# Patient Record
Sex: FEMALE | Race: BLACK OR AFRICAN AMERICAN | Employment: UNEMPLOYED | URBAN - METROPOLITAN AREA
[De-identification: names, ages, dates, MRNs, and addresses within clinical notes are randomized per-mention and may not be internally consistent; named-entity substitution may affect disease eponyms.]

---

## 2021-04-01 ENCOUNTER — OFFICE VISIT (OUTPATIENT)
Dept: ORTHOPEDIC SURGERY | Age: 41
End: 2021-04-01
Payer: COMMERCIAL

## 2021-04-01 VITALS — WEIGHT: 215 LBS | TEMPERATURE: 98.2 F | HEIGHT: 70 IN | BODY MASS INDEX: 30.78 KG/M2

## 2021-04-01 DIAGNOSIS — M65.4 DE QUERVAIN'S DISEASE (RADIAL STYLOID TENOSYNOVITIS): Primary | ICD-10-CM

## 2021-04-01 PROCEDURE — 99203 OFFICE O/P NEW LOW 30 MIN: CPT | Performed by: ORTHOPAEDIC SURGERY

## 2021-04-01 PROCEDURE — G8428 CUR MEDS NOT DOCUMENT: HCPCS | Performed by: ORTHOPAEDIC SURGERY

## 2021-04-01 PROCEDURE — G8419 CALC BMI OUT NRM PARAM NOF/U: HCPCS | Performed by: ORTHOPAEDIC SURGERY

## 2021-04-01 PROCEDURE — 4004F PT TOBACCO SCREEN RCVD TLK: CPT | Performed by: ORTHOPAEDIC SURGERY

## 2021-04-01 PROCEDURE — 20550 NJX 1 TENDON SHEATH/LIGAMENT: CPT | Performed by: ORTHOPAEDIC SURGERY

## 2021-04-01 RX ORDER — IBUPROFEN 600 MG/1
600 TABLET ORAL EVERY 6 HOURS PRN
COMMUNITY
End: 2022-05-23

## 2021-04-01 RX ORDER — TRIAMCINOLONE ACETONIDE 40 MG/ML
20 INJECTION, SUSPENSION INTRA-ARTICULAR; INTRAMUSCULAR ONCE
Status: COMPLETED | OUTPATIENT
Start: 2021-04-01 | End: 2021-04-01

## 2021-04-01 RX ORDER — LIDOCAINE HYDROCHLORIDE 10 MG/ML
0.5 INJECTION, SOLUTION INFILTRATION; PERINEURAL ONCE
Status: COMPLETED | OUTPATIENT
Start: 2021-04-01 | End: 2021-04-01

## 2021-04-01 RX ORDER — AZITHROMYCIN 250 MG/1
250 TABLET, FILM COATED ORAL DAILY
COMMUNITY
End: 2022-05-23 | Stop reason: ALTCHOICE

## 2021-04-01 RX ORDER — BENZONATATE 200 MG/1
200 CAPSULE ORAL 3 TIMES DAILY PRN
COMMUNITY
End: 2022-05-23

## 2021-04-01 RX ADMIN — LIDOCAINE HYDROCHLORIDE 0.5 ML: 10 INJECTION, SOLUTION INFILTRATION; PERINEURAL at 08:41

## 2021-04-01 RX ADMIN — TRIAMCINOLONE ACETONIDE 20 MG: 40 INJECTION, SUSPENSION INTRA-ARTICULAR; INTRAMUSCULAR at 08:42

## 2021-04-01 SDOH — HEALTH STABILITY: MENTAL HEALTH: HOW OFTEN DO YOU HAVE A DRINK CONTAINING ALCOHOL?: NOT ASKED

## 2021-04-01 NOTE — PROGRESS NOTES
This 36 y.o., right hand dominant  unemployed woman is seen today with a chief complaint of pain in the left wrist.  Symptoms have been present for 1 month. Pain starts at the radial aspect of the wrist and may radiate proximally, towards the elbow or distally, towards the thumb. There is no pain at rest.  Pain is aggravated by wrist and thumb movement and gripping. Occasionally, a popping sensation is noted on thumb movement. The wrist swells at times. A small mass has been noticed recentlyTreatment has not been tried. The pain has worsened recently. There is no history of significant injury. The pain assessment has been reviewed and is correct as stated. The patient's social history, past medical history, family history, medications, allergies and review of systems, entered 4/1/21, have been reviewed, and dated and are recorded in the chart. On examination the patient is Height: 5' 10\" (177.8 cm) tall and weighs Weight: 215 lb (97.5 kg). Respirations are 18 per minute. The patient is well nourished, is oriented to time and place, demonstrates appropriate mood and affect as well as normal gait and station. There is soft tissue swelling over the radial styloid of the wrist.  There is a small cystic mass noted. There is tenderness over the 1st dorsal compartment. There is no tenderness over the base of the thumb metacarpal.  The grind test is negative for CMC arthritis. The Yan test is positive. Range of motion of the fingers, thumbs and wrists is full bilaterally, with flexion and ulnar deviation being painful on the left. Skin is intact, as is distal circulation and sensation. Gross muscle strength is normal bilaterally. Hand and wrist joints are stable. There are no subcutaneous nodules or enlarged epitrochlear lymph nodes.     Impression:  Radial styloid tendonitis of the wrist, left, with small retinacular ganglion cyst.    The nature of this medical problem is fully discussed with the

## 2022-05-23 ENCOUNTER — OFFICE VISIT (OUTPATIENT)
Dept: PRIMARY CARE CLINIC | Age: 42
End: 2022-05-23
Payer: COMMERCIAL

## 2022-05-23 VITALS
HEIGHT: 70 IN | HEART RATE: 91 BPM | DIASTOLIC BLOOD PRESSURE: 81 MMHG | OXYGEN SATURATION: 98 % | BODY MASS INDEX: 34.22 KG/M2 | WEIGHT: 239 LBS | SYSTOLIC BLOOD PRESSURE: 114 MMHG | TEMPERATURE: 97 F

## 2022-05-23 DIAGNOSIS — Z13.220 SCREENING FOR LIPID DISORDERS: ICD-10-CM

## 2022-05-23 DIAGNOSIS — Z11.59 NEED FOR HEPATITIS C SCREENING TEST: ICD-10-CM

## 2022-05-23 DIAGNOSIS — J32.9 RECURRENT SINUS INFECTIONS: ICD-10-CM

## 2022-05-23 DIAGNOSIS — Z13.0 SCREENING FOR DEFICIENCY ANEMIA: ICD-10-CM

## 2022-05-23 DIAGNOSIS — Z13.1 SCREENING FOR DIABETES MELLITUS: ICD-10-CM

## 2022-05-23 DIAGNOSIS — M54.32 LEFT SCIATIC NERVE PAIN: Primary | ICD-10-CM

## 2022-05-23 DIAGNOSIS — Z13.29 SCREENING FOR THYROID DISORDER: ICD-10-CM

## 2022-05-23 PROCEDURE — G8417 CALC BMI ABV UP PARAM F/U: HCPCS | Performed by: NURSE PRACTITIONER

## 2022-05-23 PROCEDURE — 99202 OFFICE O/P NEW SF 15 MIN: CPT | Performed by: NURSE PRACTITIONER

## 2022-05-23 PROCEDURE — 4004F PT TOBACCO SCREEN RCVD TLK: CPT | Performed by: NURSE PRACTITIONER

## 2022-05-23 PROCEDURE — G8427 DOCREV CUR MEDS BY ELIG CLIN: HCPCS | Performed by: NURSE PRACTITIONER

## 2022-05-23 RX ORDER — TERBINAFINE HYDROCHLORIDE 250 MG/1
250 TABLET ORAL DAILY
COMMUNITY

## 2022-05-23 RX ORDER — IBUPROFEN 800 MG/1
800 TABLET ORAL
Qty: 270 TABLET | Refills: 1 | Status: SHIPPED | OUTPATIENT
Start: 2022-05-23 | End: 2022-10-24

## 2022-05-23 RX ORDER — PREDNISONE 20 MG/1
20 TABLET ORAL DAILY
Qty: 5 TABLET | Refills: 0 | Status: SHIPPED | OUTPATIENT
Start: 2022-05-23 | End: 2022-05-28

## 2022-05-23 SDOH — ECONOMIC STABILITY: FOOD INSECURITY: WITHIN THE PAST 12 MONTHS, THE FOOD YOU BOUGHT JUST DIDN'T LAST AND YOU DIDN'T HAVE MONEY TO GET MORE.: NEVER TRUE

## 2022-05-23 SDOH — ECONOMIC STABILITY: FOOD INSECURITY: WITHIN THE PAST 12 MONTHS, YOU WORRIED THAT YOUR FOOD WOULD RUN OUT BEFORE YOU GOT MONEY TO BUY MORE.: NEVER TRUE

## 2022-05-23 ASSESSMENT — PATIENT HEALTH QUESTIONNAIRE - PHQ9
SUM OF ALL RESPONSES TO PHQ QUESTIONS 1-9: 0
SUM OF ALL RESPONSES TO PHQ9 QUESTIONS 1 & 2: 0
1. LITTLE INTEREST OR PLEASURE IN DOING THINGS: 0
2. FEELING DOWN, DEPRESSED OR HOPELESS: 0
SUM OF ALL RESPONSES TO PHQ QUESTIONS 1-9: 0

## 2022-05-23 ASSESSMENT — SOCIAL DETERMINANTS OF HEALTH (SDOH): HOW HARD IS IT FOR YOU TO PAY FOR THE VERY BASICS LIKE FOOD, HOUSING, MEDICAL CARE, AND HEATING?: NOT HARD AT ALL

## 2022-05-23 ASSESSMENT — ENCOUNTER SYMPTOMS
BOWEL INCONTINENCE: 0
DIARRHEA: 0
CHEST TIGHTNESS: 0
SHORTNESS OF BREATH: 0
BACK PAIN: 1
ABDOMINAL PAIN: 0
CONSTIPATION: 0

## 2022-05-23 NOTE — PATIENT INSTRUCTIONS
Take antihistamine daily: Zyrtec, Claritin, Allegra, Xyzal  Patient Education        Sciatica: Exercises  Introduction  Here are some examples of typical rehabilitation exercises for your condition. Start each exercise slowly. Ease off the exercise if you start to have pain. Your doctor or physical therapist will tell you when you can start theseexercises and which ones will work best for you. When you are not being active, find a comfortable position for rest. Some people are comfortable on the floor or a medium-firm bed with a small pillow under their head and another under their knees. Some people prefer to lie on their side with a pillow between their knees. Don't stay in one position fortoo long. Take short walks (10 to 20 minutes) every 2 to 3 hours. Avoid slopes, hills, and stairs until you feel better. Walk only distances you can manage withoutpain, especially leg pain. How to do the exercises  Back stretches    1. Get down on your hands and knees on the floor. 2. Relax your head and allow it to droop. Round your back up toward the ceiling until you feel a nice stretch in your upper, middle, and lower back. Hold this stretch for as long as it feels comfortable, or about 15 to 30 seconds. 3. Return to the starting position with a flat back while you are on your hands and knees. 4. Let your back sway by pressing your stomach toward the floor. Lift your buttocks toward the ceiling. 5. Hold this position for 15 to 30 seconds. 6. Repeat 2 to 4 times. Follow-up care is a key part of your treatment and safety. Be sure to make and go to all appointments, and call your doctor if you are having problems. It's also a good idea to know your test results and keep alist of the medicines you take. Where can you learn more? Go to https://chmina.ShowUhow. org and sign in to your [employer] account. Enter N573 in the Q2ebanking box to learn more about \"Sciatica: Exercises. \"     If you do not have an account, please click on the \"Sign Up Now\" link. Current as of: July 1, 2021               Content Version: 13.2  © 2006-2022 Healthwise, [employer]. Care instructions adapted under license by [facility] ([facility]). If you have questions about a medical condition or this instruction, always ask your healthcare professional. [address] any warranty or liability for your use of this information. Patient Education        Piriformis Syndrome: Exercises  Introduction  Here are some examples of exercises for you to try. The exercises may be suggested for a condition or for rehabilitation. Start each exercise slowly. Ease off the exercises if you start to have pain. You will be told when to start these exercises and which ones will work bestfor you. How to do the exercises  Hip rotator stretch    1. Lie on your back with both knees bent and your feet flat on the floor. 2. Put the ankle of your affected leg on your opposite thigh near your knee. 3. Use your hand to gently push your knee (on your affected leg) away from your body until you feel a gentle stretch around your hip. 4. Hold the stretch for 15 to 30 seconds. 5. Repeat 2 to 4 times. 6. Switch legs and repeat steps 1 through 5. Piriformis stretch    1. Lie on your back with your legs straight. 2. Lift your affected leg and bend your knee. With your opposite hand, reach across your body, and then gently pull your knee toward your opposite shoulder. 3. Hold the stretch for 15 to 30 seconds. 4. Repeat with your other leg. 5. Repeat 2 to 4 times on each side. Lower abdominal strengthening    1. Lie on your back with your knees bent and your feet flat on the floor. 2. Tighten your belly muscles by pulling your belly button in toward your spine. 3. Lift one foot off the floor and bring your knee toward your chest, so that your knee is straight above your hip and your leg is bent like the letter \"L. \"  4.  Lift the other knee up to the same position. 5. Lower one leg at a time to the starting position. 6. Keep alternating legs until you have lifted each leg 8 to 12 times. 7. Be sure to keep your belly muscles tight and your back still as you are moving your legs. Be sure to breathe normally. Follow-up care is a key part of your treatment and safety. Be sure to make and go to all appointments, and call your doctor if you are having problems. It's also a good idea to know your test results and keep alist of the medicines you take. Current as of: July 1, 2021               Content Version: 13.2  © 2006-2022 Healthwise, Incorporated. Care instructions adapted under license by [facility] ([facility]). If you have questions about a medical condition or this instruction, always ask your healthcare professional. [address] any warranty or liability for your use of this information.

## 2022-05-23 NOTE — PROGRESS NOTES
Korey Will (:  1980) is a 39 y.o. female,New patient, here for evaluation of the following chief complaint(s):  New Patient and Referral - General (ENT)         ASSESSMENT/PLAN:  1. Left sciatic nerve pain  -     [facility] - Physical Therapy  -take Prednisone as prescribed  -Take Ibuprofen with food as prescribed  -Perform exercises twice daily  2. Need for hepatitis C screening test  -     Hepatitis C Antibody; Future  3. Screening for thyroid disorder  -     TSH with Reflex; Future  -     T4, Free; Future  4. Screening for lipid disorders  -     Lipid Panel; Future  5. Screening for deficiency anemia  -     CBC with Auto Differential; Future  6. Screening for diabetes mellitus  -     Comprehensive Metabolic Panel; Future  7. Recurrent sinus infections  -     Katie Sage MD, Otolaryngology, [facility]  -Take antihistamine daily: Zyrtec, Claritin, Allegra, Xyzal    No follow-ups on file. Subjective   SUBJECTIVE/OBJECTIVE:  Back Pain  This is a chronic problem. The current episode started more than 1 year ago. The pain is present in the gluteal. The quality of the pain is described as aching. The pain radiates to the left thigh. The pain is mild. The symptoms are aggravated by lying down and bending. Pertinent negatives include no abdominal pain, bladder incontinence, bowel incontinence, chest pain, dysuria, fever, headaches, numbness, pelvic pain or weakness. She has tried nothing for the symptoms. Has had intermittent pain for 5 years, not taking any medication. Most recent sinus infection in April, evaluated at Urgent Care, treated with antibiotics, effective. Requesting ENT referral. More frequent since relocating from [place]. Review of Systems   Constitutional: Negative for activity change and fever. HENT: Negative for congestion. Eyes: Negative for visual disturbance.    Respiratory: Negative for chest tightness and shortness of breath. Cardiovascular: Negative for chest pain, palpitations and leg swelling. Gastrointestinal: Negative for abdominal pain, bowel incontinence, constipation and diarrhea. Endocrine: Negative for polyuria. Genitourinary: Negative for bladder incontinence, dysuria and pelvic pain. Musculoskeletal: Positive for back pain. Negative for arthralgias and myalgias. Skin: Negative for rash. Neurological: Negative for dizziness, weakness, light-headedness, numbness and headaches. Psychiatric/Behavioral: Negative for agitation, decreased concentration and sleep disturbance. The patient is not nervous/anxious. Objective     height is 5' 10\" (1.778 m) and weight is 239 lb (108.4 kg). Her temporal temperature is 97 °F (36.1 °C). Her blood pressure is 114/81 and her pulse is 91. Her oxygen saturation is 98%. BP Readings from Last 3 Encounters:   05/23/22 114/81     Wt Readings from Last 3 Encounters:   05/23/22 239 lb (108.4 kg)   04/01/21 215 lb (97.5 kg)     Physical Exam  Vitals reviewed. Constitutional:       Appearance: She is well-developed. HENT:      Head: Normocephalic. Right Ear: Hearing and external ear normal.      Left Ear: Hearing and external ear normal.      Nose: Nose normal.   Eyes:      General: Lids are normal.   Cardiovascular:      Rate and Rhythm: Normal rate and regular rhythm. Heart sounds: Normal heart sounds, S1 normal and S2 normal.   Pulmonary:      Effort: Pulmonary effort is normal.      Breath sounds: Normal breath sounds. Musculoskeletal:         General: Normal range of motion. Skin:     General: Skin is warm and dry. Findings: No rash. Neurological:      Mental Status: She is alert and oriented to person, place, and time. GCS: GCS eye subscore is 4. GCS verbal subscore is 5. GCS motor subscore is 6. Psychiatric:         Speech: Speech normal.         Behavior: Behavior normal. Behavior is cooperative.             On this date

## 2022-06-03 ENCOUNTER — APPOINTMENT (OUTPATIENT)
Dept: PHYSICAL THERAPY | Age: 42
End: 2022-06-03
Payer: COMMERCIAL

## 2022-06-10 ENCOUNTER — HOSPITAL ENCOUNTER (OUTPATIENT)
Dept: PHYSICAL THERAPY | Age: 42
Setting detail: THERAPIES SERIES
Discharge: HOME | End: 2022-06-10
Payer: COMMERCIAL

## 2022-06-10 PROCEDURE — 97161 PT EVAL LOW COMPLEX 20 MIN: CPT

## 2022-06-10 PROCEDURE — 97140 MANUAL THERAPY 1/> REGIONS: CPT

## 2022-06-10 PROCEDURE — 97530 THERAPEUTIC ACTIVITIES: CPT

## 2022-06-10 NOTE — PLAN OF CARE
[facility] [address] [address]  Phone: [phone]   Fax:     [phone]                                                       Physical Therapy Certification    Dear Referring Provider (secondary): VICKI Liu CNP,    We had the pleasure of evaluating the following patient for physical therapy services at [facility]. A summary of our findings can be found in the initial assessment below. This includes our plan of care. If you have any questions or concerns regarding these findings, please do not hesitate to contact me at the office phone number checked above. Thank you for the referral.       Physician Signature:_______________________________Date:__________________  By signing above (or electronic signature), therapists plan is approved by physician                  Patient: Tariq Fernandez   : 1980   MRN: [id number]  Referring Physician: Referring Provider (secondary): VICKI Liu CNP      Evaluation Date: 6/10/2022      Medical Diagnosis Information:  Diagnosis: M54.32 (ICD-10-CM) - Left sciatic nerve pain                                             Insurance information:       Precautions/ Contra-indications:   Latex Allergy:  [x]NO      []YES  Preferred Language for Healthcare:   [x]English       []other:    C-SSRS Triggered by Intake questionnaire (Past 2 wk assessment ):   [x] No, Questionnaire did not trigger screening.   [] Yes, Patient intake triggered C-SSRS Screening      [] C-SSRS Screening completed  [] PCP notified via Epic     SUBJECTIVE: Patient stated complaint: Pt here for evaluation for L sided LBP that radiates to her posterior hip and leg down to her knee, not below the knee. Hx of L ACL reconstruction many years ago (but she still feels like LLE has strength deficits).  This back/leg pain has been present for about 4-5 yrs. This has been an intermittent problem over the years, but worse lately. No formal treatment in the past. Denies numbness/ tingling. Worse with standing and walking, better with sitting, heat pad, and NSAIDs. She did have a steroid dose pack, which helped for several days, but the pain did return. Denies bowel/ bladder changes. *pain with walking even a few steps. Limit is about 50% of a block where she would need to sit down. *past 3 wks avg sleep: 3-4hours of sleep. Recently lost her brother, did not sleep for a week and a half. Relevant Medical History:   Functional Outcome Measure: FOTO = 41    Pain Scale: 6-7/10  Easing factors: sitting, rest, meds  Provocative factors: standing, walking. 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 Living Status/Prior Level of Function: Independent with ADLs and IADLs    OBJECTIVE:   Posture: stands with some excessive lordosis    Functional Mobility/Transfers: some difficulty turning on table. Palpation: tender L L4 and R L3, tender bilat superior glutes    Gait: (include devices/WB status)     Bandages/Dressings/Incisions: NA    Repeated Movements:worse with extension    ROM  Comments   Lumbar Flex Full hands to floor No pain   Lumbar Ext Mod limit and LBP into hips bilat      ROM LEFT RIGHT Comments   Lumbar Side Bend Mod limit w/p Mod limit w/p    Lumbar Rotation WFL Mod limit w/p    Quadrant      Hip Flexion   B Hips are WNL globally to hypermobile.     Hip Abd      Hip ER      Hip IR      Hip Extension      Knee Ext      Knee Flex      Hamstring Flex      Piriformis      Bobby test                Myotomes/Strength Normal Abnormal Comments   []ALL NORMAL      Hip Abd      Hip Ext      Hip flexion (L1-L2 femoral) [] []    Knee extension (L2-L4 femoral) [x] []    Knee flexion (S1 sciatic) x Dorsiflexion (L4-L5 deep peroneal) [x] []    Great Toe Ext (L5 deep peroneal nerve) [] []    Ankle Eversion (S1-S2 super peroneal) [] []    Ankle PF(S1-S2 tibial) [] []    Multifidus [] []    Transverse Ab [] []      Dermatomes Normal Abnormal Comments   []ALL NORMAL            inguinal area (L1)  [] []    anterior mid-thigh (L2) [] []    distal ant thigh/med knee (L3) [] []    medial lower leg and foot (L4) [] []    lateral lower leg and foot (L5) [] []    posterior calf (S1) [] []    medial calcaneus (S2) [] []      Reflexes Normal Abnormal Comments   []ALL NORMAL            S1-2 Seated achilles [] []    S1-2 Prone knee bend [] []    L3-4 Patellar tendon [] []    C5-6 Biceps [] []    C6 Brachioradialis [] []    C7-8 Triceps [] []    Clonus [] []    Babinski [] []    Shepard's [] []      Joint mobility:    []Normal    [x]Hypo   []Hyper    Neurodynamics:     Orthopedic Special Tests:     Single leg squat: mild hip/knee functional deficit on L, also increased L LBP   Neural dynamic tension testing Normal Abnormal Comments   Slump Test  - Degree of knee flexion:  [] []    SLR  [] []    0-30 [] []    30-70 [] []    Femoral nerve (L2-4) [] []       Normal Abnormal N/A Comments   Fwd Bend-aberrant or innominate mvmt) [] [x] [] Abberant, some difficulty w/ return from flex   Trendelenburg [] [] []    Kemps/Quadrant [] [] []    Kai Ferris [] [] []    AURORA/Casey [x] [] []    Hip scour [x] [] []    Supine to sit [] [] []    Prone knee bend [] [] []           Hip thrust [] [] []    SI distraction/compression [] [] []    Sacral Spring/thrust [] [] []               [x] Patient history, allergies, meds reviewed. Medical chart reviewed. See intake form. Review Of Systems (ROS):  [x]Performed Review of systems (Integumentary, CardioPulmonary, Neurological) by intake and observation. Intake form has been scanned into medical record.  Patient has been instructed to contact their primary care physician regarding ROS issues if not already being addressed at this time. Co-morbidities/Complexities (which will affect course of rehabilitation):  has no past medical history on file. []None           Arthritic conditions   []Rheumatoid arthritis (M05.9)  []Osteoarthritis (M19.91)   Cardiovascular conditions   []Hypertension (I10)  []Hyperlipidemia (E78.5)  []Angina pectoris (I20)  []Atherosclerosis (I70)  []CVA Musculoskeletal conditions   []Disc pathology   []Congenital spine pathologies   []Prior surgical intervention  []Osteoporosis (M81.8)  []Osteopenia (M85.8)   Endocrine conditions   []Hypothyroid (E03.9)  []Hyperthyroid Gastrointestinal conditions   []Constipation (E74.68)   Metabolic conditions   []Morbid obesity (E66.01)  []Diabetes type 1(E10.65) or 2 (E11.65)   []Neuropathy (G60.9)     Pulmonary conditions   []Asthma (J45)  []Coughing   []COPD (J44.9)   Psychological Disorders  []Anxiety (F41.9)  []Depression (F32.9)   []Other:   [x]Other:     Elevated BMI      Barriers to/and or personal factors that will affect rehab potential:              []Age  []Sex    []Smoker              []Motivation/Lack of Motivation                        [x]Co-Morbidities              []Cognitive Function, education/learning barriers              []Environmental, home barriers              []profession/work barriers  [x]past PT/medical experience  []other:  Justification:     Falls Risk Assessment (30 days):   [x] Falls Risk assessed and no intervention required.   [] Falls Risk assessed and Patient requires intervention due to being higher risk   TUG score (>12s at risk):     [] Falls education provided, including:         ASSESSMENT:   Functional Impairments:     [x]Noted lumbar/proximal hip hypomobility   []Noted lumbosacral and/or generalized hypermobility   [x]Decreased Lumbosacral/hip/LE functional ROM   [x]Decreased core/proximal hip strength and neuromuscular control    [x]Decreased LE functional strength    []Abnormal reflexes/sensation/myotomal/dermatomal deficits  [x]Reduced balance/proprioceptive control    []other:      Functional Activity Limitations (from functional questionnaire and intake)   [x]Reduced ability to tolerate prolonged functional positions   [x]Reduced ability or difficulty with changes of positions or transfers between positions   [x]Reduced ability to maintain good posture and demonstrate good body mechanics with sitting, bending, and lifting   [x]Reduced ability to sleep   [] Reduced ability or tolerance with driving and/or computer work   [x]Reduced ability to perform lifting, reaching, carrying tasks   []Reduced ability to squat   []Reduced ability to forward bend   [x]Reduced ability to ambulate prolonged functional periods/distances/surfaces   [x]Reduced ability to ascend/descend stairs   []other:       Participation Restrictions   []Reduced participation in self care activities   [x]Reduced participation in home management activities   []Reduced participation in work activities   [x]Reduced participation in social activities. []Reduced participation in sport/recreational activities. Classification:   [x]Signs/symptoms consistent with Lumbar instability/stabilization subgroup. [x]Signs/symptoms consistent with Lumbar mobilization/manipulation subgroup, myotomes and dermatomes intact. Meets manipulation criteria. []Signs/symptoms consistent with Lumbar direction specific/centralization subgroup   []Signs/symptoms consistent with Lumbar traction subgroup       [x]Signs/symptoms consistent with lumbar facet dysfunction   []Signs/symptoms consistent with lumbar stenosis type dysfunction   []Signs/symptoms consistent with nerve root involvement including myotome & dermatome dysfunction   []Signs/symptoms consistent with post-surgical status including: decreased ROM, strength and function.    [x]signs/symptoms consistent with pathology which may benefit from Dry needling     []other: Prognosis/Rehab Potential:      []Excellent   [x]Good    []Fair   []Poor    Tolerance of evaluation/treatment:    []Excellent   [x]Good    []Fair   []Poor     Physical Therapy Evaluation Complexity Justification  [x] A history of present problem with:  [] no personal factors and/or comorbidities that impact the plan of care;  [x]1-2 personal factors and/or comorbidities that impact the plan of care  []3 personal factors and/or comorbidities that impact the plan of care  [x] An examination of body systems using standardized tests and measures addressing any of the following: body structures and functions (impairments), activity limitations, and/or participation restrictions;:  [x] a total of 1-2 or more elements   [] a total of 3 or more elements   [] a total of 4 or more elements   [x] A clinical presentation with:  [x] stable and/or uncomplicated characteristics   [] evolving clinical presentation with changing characteristics  [] unstable and unpredictable characteristics;   [x] Clinical decision making of [] low, [] moderate, [] high complexity using standardized patient assessment instrument and/or measurable assessment of functional outcome. [x] EVAL (LOW) 68709 (typically 20 minutes face-to-face)  [] EVAL (MOD) 86062 (typically 30 minutes face-to-face)  [] EVAL (HIGH) 38496 (typically 45 minutes face-to-face)  [] RE-EVAL     PLAN:    Frequency/Duration:  Initially we will plan for up to 2x/wk for up to 5 visits per [facility] policy. If additional visits are requested, we will send progress note. Interventions:  [x]  Therapeutic exercise including: strength training, ROM, for LE, Glutes and core   [x]  NMR activation and proprioception for glutes , LE and Core   [x]  Manual therapy as indicated for Hip complex, LE and spine to include: Dry Needling/IASTM, STM, PROM, Gr I-IV mobilizations, manipulation.    [x]  Modalities as needed that may include: thermal agents, E-stim, Biofeedback, US, iontophoresis as indicated  [x]  Patient education on joint protection, postural re-education, activity modification, progression of HEP. HEP instruction: SL Rot, pelvic tilt - painfree ROM     GOALS:  Patient stated goal: able to walk community distances w/o LBP. [] Progressing: [] Met: [] Not Met: [] Adjusted  Therapist goals for Patient:   Short Term Goals: To be achieved in: 2 weeks  1. Independent in HEP and progression per patient tolerance, in order to prevent re-injury. [] Progressing: [] Met: [] Not Met: [] Adjusted  2. Patient will have a decrease in pain to facilitate improvement in movement, function, and ADLs as indicated by Functional Deficits. [] Progressing: [] Met: [] Not Met: [] Adjusted    Long Term Goals: To be achieved in: up to 8 weeks  1. Increase FOTO functional outcome score from 41 to 57  to assist with reaching prior level of function. [] Progressing: [] Met: [] Not Met: [] Adjusted  2. Patient will demonstrate increased AROM to WNL, good LS mobility, good hip ROM to allow for proper joint functioning as indicated by patients Functional Deficits. [] Progressing: [] Met: [] Not Met: [] Adjusted  3. Patient will demonstrate an increase in Strength to good proximal hip and core activation to allow for proper functional mobility as indicated by patients Functional Deficits. [] Progressing: [] Met: [] Not Met: [] Adjusted  4. Patient will return to all functional activities without increased symptoms or restriction. [] Progressing: [] Met: [] Not Met: [] Adjusted    Electronically signed by: Ashley Victoria PT , DPT, OCS #[id number]          Note: If patient does not return for scheduled/recommended follow up visits, this note will serve as a discharge from care along with the most recent update on progress.

## 2022-06-10 NOTE — FLOWSHEET NOTE
[address] [facility] [address]  Phone: [phone]   Fax:     [phone]    Physical Therapy Treatment Note/ Progress Report:     Date:  6/10/2022    Patient Name:  Brandon Norwood    :  1980  MRN: [id number]    Pertinent Medical History:      Medical/Treatment Diagnosis Information:  Diagnosis: M54.32 (ICD-10-CM) - Left sciatic nerve pain  Treatment Diagnosis: back pain, limited mobility, function    Insurance/Certification information:     Physician Information:  Referring Provider (secondary): VICKI Teixeira CNP  Plan of care signed (Y/N):     Date of Patient follow up with Physician:      Progress Report: []  Yes  [x]  No     Date Range for reporting period:  Beginnin/10/2022  Ending:    Progress report due (10 Rx/or 30 days whichever is less):      Recertification due (POC duration/ or 90 days whichever is less):     Visit # POC/ Insurance Allowable Auth Needed    (MSK) auth after 30 per ins []Yes    []No     Functional Scale:       Date Assessed: at eval  Test: FOTO  Score:     Pain level:  /10     History of Injury:Pt here for evaluation for L sided LBP that radiates to her posterior hip and leg down to her knee, not below the knee. Hx of L ACL reconstruction many years ago (but she still feels like LLE has strength deficits). This back/leg pain has been present for about 4-5 yrs. This has been an intermittent problem over the years, but worse lately. No formal treatment in the past. Denies numbness/ tingling. Worse with standing and walking, better with sitting, heat pad, and NSAIDs. She did have a steroid dose pack, which helped for several days, but the pain did return. Denies bowel/ bladder changes.      *pain with walking even a few steps. Limit is about 50% of a block where she would need to sit down. *past 3 wks avg sleep: 3-4hours of sleep. Recently lost her brother, did not sleep for a week and a half. *initial standing/ walking tolerance: less than 10 steps    SUBJECTIVE:  See eval    OBJECTIVE:    Observation:    Test measurements:      RESTRICTIONS/PRECAUTIONS:     Exercises/Interventions:     Therapeutic Ex (22584)   Min: 8' Resistance/Reps Notes/Cues             SL Rot x10 R,L          squat >    bridge >? Trunk rot >? Pt edu HEP review    Manual Intervention (72266) Min: 10'     Lumbar manual SL gap: low to med+ w/ thrusts 1 cavitation; mod reduction in pain        NMR re-education (28944)   Min:     Pelvic tilt hookly 2x10 Pain free ROM   Quad cat camel >    Quad UE,LE >         Therapeutic Activity (49704) Min: 10'     Pt edu Pathology, prognosis, role of PT, gave sleep tips handout, educated on importance of quality sleep/ stress mgmt         Modalities  Min:             Other Therapeutic Activities:  Pt was educated on PT POC, Diagnosis, Prognosis, pathomechanics as well as frequency and duration of scheduling future physical therapy appointments. Time was also taken on this day to answer all patient questions and participation in PT. Reviewed appointment policy in detail with patient and patient verbalized understanding. Home Exercise Program:     6/10: SL Rot, pelvic tilt (subpainful ROM), improve sleep quality/ time, stay active    Therapeutic Exercise and NMR EXR  [x] (11910) Provided verbal/tactile cueing for activities related to strengthening, flexibility, endurance, ROM  for improvements in proximal hip and core control with self care, mobility, lifting and ambulation. [x] (69680) Provided verbal/tactile cueing for activities related to improving balance, coordination, kinesthetic sense, posture, motor skill, proprioception  to assist with core control in self care, mobility, lifting, and ambulation.      Therapeutic Activities:    [x] (83298 or ) Provided verbal/tactile cueing for activities related to improving balance, coordination, kinesthetic sense, posture, motor skill, proprioception and motor activation to allow for proper function  with self care and ADLs  [] (46824) Provided training and instruction to the patient for proper core and proximal hip recruitment and positioning with ambulation re-education     Home Exercise Program:    [x] (60928) Reviewed/Progressed HEP activities related to strengthening, flexibility, endurance, ROM of core, proximal hip and LE for functional self-care, mobility, lifting and ambulation   [x] (26191) Reviewed/Progressed HEP activities related to improving balance, coordination, kinesthetic sense, posture, motor skill, proprioception of core, proximal hip and LE for self care, mobility, lifting, and ambulation      Manual Treatments:  PROM / STM / Oscillations-Mobs:  G-I, II, III, IV (PA's, Inf., Post.)  [x] (90608) Provided manual therapy to mobilize proximal hip and LS spine soft tissue/joints for the purpose of modulating pain, promoting relaxation,  increasing ROM, reducing/eliminating soft tissue swelling/inflammation/restriction, improving soft tissue extensibility and allowing for proper ROM for normal function with self care, mobility, lifting and ambulation.      Approval Dates:  CPT Code Units Approved Units Used  Date Updated:                     Charges:  Timed Code Treatment Minutes: 28   Total Treatment Minutes: 50     [x] EVAL (LOW) 00514 (typically 20 minutes face-to-face)  [] EVAL (MOD) 36736 (typically 30 minutes face-to-face)  [] EVAL (HIGH) 70013 (typically 45 minutes face-to-face)  [] RE-EVAL     [] KA(50966) x     [] Dry needle 1 or 2 Muscles (97653)  [] NMR (38171) x     [] Dry needle 3+ Muscles (78277)  [] Manual (53409) x     [] Ultrasound (62238) x  [] TA (74018) x     [] [facility] (11188[facility]  [] ES(attended) (64222)     [] ES (un) (32384):   [] Vasopump (60046) [] Ionto (91365)   [] Other:      Approval Dates:  CPT Code Units Approved Units Used  Date Updated:                     GOALS:  Patient stated goal: able to walk community distances w/o LBP. [] Progressing: [] Met: [] Not Met: [] Adjusted  Therapist goals for Patient:   Short Term Goals: To be achieved in: 2 weeks  1. Independent in HEP and progression per patient tolerance, in order to prevent re-injury. [] Progressing: [] Met: [] Not Met: [] Adjusted  2. Patient will have a decrease in pain to facilitate improvement in movement, function, and ADLs as indicated by Functional Deficits. [] Progressing: [] Met: [] Not Met: [] Adjusted    Long Term Goals: To be achieved in: up to 8 weeks  1. Increase FOTO functional outcome score from 41 to 57  to assist with reaching prior level of function. [] Progressing: [] Met: [] Not Met: [] Adjusted  2. Patient will demonstrate increased AROM to WNL, good LS mobility, good hip ROM to allow for proper joint functioning as indicated by patients Functional Deficits. [] Progressing: [] Met: [] Not Met: [] Adjusted  3. Patient will demonstrate an increase in Strength to good proximal hip and core activation to allow for proper functional mobility as indicated by patients Functional Deficits. [] Progressing: [] Met: [] Not Met: [] Adjusted  4. Patient will return to all functional activities without increased symptoms or restriction. [] Progressing: [] Met: [] Not Met: [] Adjusted    ASSESSMENT:  See eval    Treatment/Activity Tolerance:  [x] Patient tolerated treatment well [] Patient limited by fatique  [] Patient limited by pain  [] Patient limited by other medical complications  [] Other:     Overall Progression Towards Functional goals/ Treatment Progress Update:  [] Patient is progressing as expected towards functional goals listed. [] Progression is slowed due to complexities/Impairments listed. [] Progression has been slowed due to co-morbidities.   [x] Plan just implemented, too soon to assess goals progression <30days   [] Goals require adjustment due to lack of progress  [] Patient is not progressing as expected and requires additional follow up with physician  [] Other:    Prognosis for POC: [x] Good [] Fair  [] Poor    Patient requires continued skilled intervention: [x] Yes  [] No        PLAN: See eval  [] Continue per plan of care [] Alter current plan (see comments)  [x] Plan of care initiated [] Hold pending MD visit [] Discharge    Electronically signed by: Connie Villalobos, PT , DPT, [facility] #[id number]      Note: If patient does not return for scheduled/recommended follow up visits, this note will serve as a discharge from care along with the most recent update on progress.

## 2022-06-15 ENCOUNTER — HOSPITAL ENCOUNTER (OUTPATIENT)
Dept: PHYSICAL THERAPY | Age: 42
Setting detail: THERAPIES SERIES
Discharge: HOME | End: 2022-06-15
Payer: COMMERCIAL

## 2022-06-15 NOTE — FLOWSHEET NOTE
[address] [facility] [place] [address]  Phone: [phone]   Fax:     [phone]    Physical Therapy  Cancellation/No-show Note  Patient Name:  Bela Pham  :  1980   Date:  6/15/2022  Cancelled visits to date: 0  No-shows to date: 1    Patient status for today's appointment patient:  []  Cancelled  []  Rescheduled appointment  [x]  No-show     Reason given by patient:  []  Patient ill  []  Conflicting appointment  []  No transportation    []  Conflict with work  []  No reason given  []  Other:     Comments:      Phone call information:   [x]  Phone call made today to patient at _ time at number provided:      []  Patient answered, conversation as follows:    [x]  Patient did not answer, message left as follows: informed of missed visit and next visit date/ time. Reminded of attendance policy. []  Phone call not made today    Electronically signed by:   Shanna Garcia PT

## 2022-06-21 NOTE — PROGRESS NOTES
Jacob Lantigua (:  1980) is a 39 y.o. female,Established patient, here for evaluation of the following chief complaint(s):  Follow-up (left ankle sprain )         ASSESSMENT/PLAN:  1. Injury of left ankle, subsequent encounter- fall on  while out of town; torn ligament noted on imaging in [place], unable to rule out fracture due to swelling; splint in place, using crutches; Swelling of foot and latera aspect of ankle; increased pain  - schedule appointment:    Carlos Sun MD, Orthopedic Surgery, [facility] [place]  - Take as needed    ULTRAM 50 MG tablet; Take 1 tablet by mouth every 8 hours as needed for Pain for up to 11 days. Intended supply: 3 days. Take lowest dose possible to manage pain, Disp-32 tablet, R-0, DAWNormal  -Ultram causes drowsiness, no driving or drinking while taking  -keep foot elevated  2. Essential hypertension-uncontrolled; pain may cause increase   - Start    valsartan (DIOVAN) 40 MG tablet; Take 1 tablet by mouth daily, Disp-90 tablet, R-1Normal  3. Recurrent sinus infections  -Followed by ENT  4. Obesity due to excess calories without serious comorbidity, unspecified classification  The patient is advised to begin progressive daily aerobic exercise program, follow a low fat, low cholesterol diet, attempt to lose weight, reduce exposure to stress, improve dietary compliance and continue current medications. Return in about 4 weeks (around 2022). Ms. Fatou Lau evaluated by ENT today for recurrent sinus infections, started on antibiotics, will follow up in 3 weeks for possible CT scan of sinuses. Subjective   SUBJECTIVE/OBJECTIVE:  Hypertension  This is a new problem. Pertinent negatives include no anxiety, chest pain, neck pain, palpitations, PND or sweats. Risk factors for coronary artery disease include obesity. Past treatments include nothing. Back Pain  This is a chronic problem. The current episode started more than 1 year ago.  The pain is present in the gluteal. The quality of the pain is described as aching. The pain radiates to the left thigh. The pain is mild. The symptoms are aggravated by lying down and bending. Pertinent negatives include no abdominal pain, bladder incontinence, bowel incontinence, chest pain, dysuria, fever, headaches, numbness, pelvic pain or weakness. She has tried nothing for the symptoms. Has had intermittent pain for 5 years, not taking any medication. Ankle Pain  Left ankle pain started on Sunday after a slip off curb in [place]. Evaluated at facility there and Imaging revealed torn ligament and sprain. Drove home from [place] on Monday. C/o pain 9/10, ambulating with crutch. Elevating and applying ice. Obesity  General weight loss/lifestyle modification strategies discussed (elicit support from others; identify saboteurs; non-food rewards, etc). Goals:   -Eat 4-5 times daily  -Avoid high fat and high sugar foods  -Include protein with all meals and snacks  -Avoid carbonation and caffeine  -Avoid calorie containing beverages  -Increase physical activity as tolerated    Overweight/Obesity related to lack of exercise, sedentary lifestyle, unhealthy eating habits, and unsuccessful diet attempts as evidenced by BMI. Review of Systems   Constitutional: Negative for activity change and fever. HENT: Negative for congestion. Eyes: Negative for visual disturbance. Respiratory: Negative for chest tightness. Cardiovascular: Negative for chest pain, palpitations, leg swelling and PND. Gastrointestinal: Negative for abdominal pain, constipation and diarrhea. Endocrine: Negative for polyuria. Genitourinary: Negative for dysuria. Musculoskeletal: Negative for arthralgias, myalgias and neck pain. Skin: Negative for rash. Neurological: Negative for dizziness and light-headedness. Psychiatric/Behavioral: Negative for agitation, decreased concentration and sleep disturbance.  The patient is not nervous/anxious. Objective     height is 5' 10\" (1.778 m) and weight is 250 lb (113.4 kg). Her temperature is 97.2 °F (36.2 °C). Her blood pressure is 179/121 (abnormal) and her pulse is 90. Her oxygen saturation is 93%. BP Readings from Last 3 Encounters:   06/22/22 (!) 179/121   06/22/22 (!) 149/101   05/23/22 114/81     Wt Readings from Last 3 Encounters:   06/22/22 250 lb (113.4 kg)   06/22/22 250 lb (113.4 kg)   05/23/22 239 lb (108.4 kg)     Physical Exam  Vitals reviewed. Constitutional:       Appearance: She is well-developed. HENT:      Head: Normocephalic. Right Ear: Hearing and external ear normal.      Left Ear: Hearing and external ear normal.      Nose: Nose normal.   Eyes:      General: Lids are normal.   Cardiovascular:      Rate and Rhythm: Normal rate and regular rhythm. Heart sounds: Normal heart sounds, S1 normal and S2 normal.   Pulmonary:      Effort: Pulmonary effort is normal.      Breath sounds: Normal breath sounds. Musculoskeletal:         General: Normal range of motion. Feet:      Comments: Splint on left foot and ankle. Swelling noted, greater on lateral aspect of ankle and top of foot  Skin:     General: Skin is warm and dry. Findings: No rash. Neurological:      Mental Status: She is alert and oriented to person, place, and time. GCS: GCS eye subscore is 4. GCS verbal subscore is 5. GCS motor subscore is 6. Psychiatric:         Speech: Speech normal.         Behavior: Behavior normal. Behavior is cooperative. On this date 6/22/2022 I have spent 20 minutes reviewing previous notes, test results and face to face with the patient discussing the diagnosis and importance of compliance with the treatment plan as well as documenting on the day of the visit. An electronic signature was used to authenticate this note.     --VICKI Sherman - CNP

## 2022-06-22 ENCOUNTER — OFFICE VISIT (OUTPATIENT)
Dept: PRIMARY CARE CLINIC | Age: 42
End: 2022-06-22
Payer: COMMERCIAL

## 2022-06-22 ENCOUNTER — OFFICE VISIT (OUTPATIENT)
Dept: ENT CLINIC | Age: 42
End: 2022-06-22
Payer: COMMERCIAL

## 2022-06-22 VITALS
WEIGHT: 250 LBS | HEIGHT: 70 IN | TEMPERATURE: 97.2 F | BODY MASS INDEX: 35.79 KG/M2 | SYSTOLIC BLOOD PRESSURE: 179 MMHG | OXYGEN SATURATION: 93 % | HEART RATE: 90 BPM | DIASTOLIC BLOOD PRESSURE: 121 MMHG

## 2022-06-22 VITALS
DIASTOLIC BLOOD PRESSURE: 101 MMHG | BODY MASS INDEX: 35.79 KG/M2 | HEIGHT: 70 IN | WEIGHT: 250 LBS | HEART RATE: 92 BPM | SYSTOLIC BLOOD PRESSURE: 149 MMHG

## 2022-06-22 DIAGNOSIS — J34.89 NASAL SEPTAL PERFORATION: ICD-10-CM

## 2022-06-22 DIAGNOSIS — J32.9 RECURRENT SINUS INFECTIONS: ICD-10-CM

## 2022-06-22 DIAGNOSIS — J34.2 DEVIATED SEPTUM: ICD-10-CM

## 2022-06-22 DIAGNOSIS — E66.09 OBESITY DUE TO EXCESS CALORIES WITHOUT SERIOUS COMORBIDITY, UNSPECIFIED CLASSIFICATION: ICD-10-CM

## 2022-06-22 DIAGNOSIS — J32.4 CHRONIC PANSINUSITIS: ICD-10-CM

## 2022-06-22 DIAGNOSIS — R09.81 NASAL CONGESTION: ICD-10-CM

## 2022-06-22 DIAGNOSIS — J30.9 ALLERGIC RHINITIS, UNSPECIFIED SEASONALITY, UNSPECIFIED TRIGGER: Primary | ICD-10-CM

## 2022-06-22 DIAGNOSIS — J34.3 HYPERTROPHY OF INFERIOR NASAL TURBINATE: ICD-10-CM

## 2022-06-22 DIAGNOSIS — S99.912D INJURY OF LEFT ANKLE, SUBSEQUENT ENCOUNTER: Primary | ICD-10-CM

## 2022-06-22 DIAGNOSIS — I10 ESSENTIAL HYPERTENSION: ICD-10-CM

## 2022-06-22 PROCEDURE — 99204 OFFICE O/P NEW MOD 45 MIN: CPT | Performed by: OTOLARYNGOLOGY

## 2022-06-22 PROCEDURE — 99214 OFFICE O/P EST MOD 30 MIN: CPT | Performed by: NURSE PRACTITIONER

## 2022-06-22 PROCEDURE — 4004F PT TOBACCO SCREEN RCVD TLK: CPT | Performed by: NURSE PRACTITIONER

## 2022-06-22 PROCEDURE — G8427 DOCREV CUR MEDS BY ELIG CLIN: HCPCS | Performed by: NURSE PRACTITIONER

## 2022-06-22 PROCEDURE — 4004F PT TOBACCO SCREEN RCVD TLK: CPT | Performed by: OTOLARYNGOLOGY

## 2022-06-22 PROCEDURE — G8417 CALC BMI ABV UP PARAM F/U: HCPCS | Performed by: NURSE PRACTITIONER

## 2022-06-22 PROCEDURE — G8427 DOCREV CUR MEDS BY ELIG CLIN: HCPCS | Performed by: OTOLARYNGOLOGY

## 2022-06-22 PROCEDURE — 31231 NASAL ENDOSCOPY DX: CPT | Performed by: OTOLARYNGOLOGY

## 2022-06-22 PROCEDURE — G8417 CALC BMI ABV UP PARAM F/U: HCPCS | Performed by: OTOLARYNGOLOGY

## 2022-06-22 RX ORDER — DOXYCYCLINE HYCLATE 100 MG
100 TABLET ORAL 2 TIMES DAILY
Qty: 42 TABLET | Refills: 0 | Status: SHIPPED | OUTPATIENT
Start: 2022-06-22 | End: 2022-07-13

## 2022-06-22 RX ORDER — FLUTICASONE PROPIONATE 50 MCG
1 SPRAY, SUSPENSION (ML) NASAL DAILY
Qty: 32 G | Refills: 1 | Status: SHIPPED | OUTPATIENT
Start: 2022-06-22 | End: 2022-09-30 | Stop reason: SDUPTHER

## 2022-06-22 RX ORDER — VALSARTAN 40 MG/1
40 TABLET ORAL DAILY
Qty: 90 TABLET | Refills: 1 | Status: SHIPPED | OUTPATIENT
Start: 2022-06-22 | End: 2022-10-24

## 2022-06-22 RX ORDER — TRAMADOL HYDROCHLORIDE 50 MG/1
50 TABLET, COATED ORAL EVERY 8 HOURS PRN
Qty: 32 TABLET | Refills: 0 | Status: SHIPPED | OUTPATIENT
Start: 2022-06-22 | End: 2022-07-03

## 2022-06-22 ASSESSMENT — ENCOUNTER SYMPTOMS
CONSTIPATION: 0
CHEST TIGHTNESS: 0
ABDOMINAL PAIN: 0
DIARRHEA: 0

## 2022-06-22 ASSESSMENT — PATIENT HEALTH QUESTIONNAIRE - PHQ9
1. LITTLE INTEREST OR PLEASURE IN DOING THINGS: 0
SUM OF ALL RESPONSES TO PHQ QUESTIONS 1-9: 0
2. FEELING DOWN, DEPRESSED OR HOPELESS: 0
SUM OF ALL RESPONSES TO PHQ QUESTIONS 1-9: 0
SUM OF ALL RESPONSES TO PHQ QUESTIONS 1-9: 0
SUM OF ALL RESPONSES TO PHQ9 QUESTIONS 1 & 2: 0
SUM OF ALL RESPONSES TO PHQ QUESTIONS 1-9: 0

## 2022-06-22 NOTE — PATIENT INSTRUCTIONS
Have fasting labs drawn  Schedule appointment with Orthopedics  No drinking or driving while taking Ultram

## 2022-06-22 NOTE — PROGRESS NOTES
[facility]      Patient Name: Jules Richardson Record Number:  [id number]  Primary Care Physician:  VICKI Sherman CNP  Date of Consultation: 6/22/2022    Chief Complaint: Nasal issues        HISTORY OF PRESENT ILLNESS  Tahmina Kraus is a(n) 39 y.o. female who presents for evaluation of nasal issues. The patient's been having intermittent nasal and ear issues for a long time. She says that she feels that she be getting about 3 ear infections a year for the last 1 to 2 years. Is mostly on the left side. She also has sinus infections. Even at baseline she has chronic nasal congestion. She says that her sense of smell is okay. She does admit to a history of intranasal drug use. She says that she is no longer using this. She is a smoker. She intermittently uses things like Flonase. She does irrigate her nose. REVIEW OF SYSTEMS  As above    PHYSICAL EXAM  GENERAL: No Acute Distress, Alert and Oriented, no Hoarseness, strong voice  EYES: EOMI, Anti-icteric  HENT:   Head: Normocephalic and atraumatic. Face:  Symmetric, facial nerve intact, no sinus tenderness  Right Ear: Normal external ear, normal external auditory canal, intact tympanic membrane with normal mobility and aerated middle ear  Left Ear: Normal external ear, normal external auditory canal, intact tympanic membrane with normal mobility and aerated middle ear  Mouth/Oral Cavity:  normal lips, Uvula is midline, no mucosal lesions, no trismus  Oropharynx/Larynx:  normal oropharynx  Nose:Normal external nasal appearance. See below  NECK: Normal range of motion, no thyromegaly, trachea is midline, no lymphadenopathy, no neck masses, no crepitus          PROCEDURE  Nasal endoscopy  Afrin lidocaine were applied the bilateral nasal cavity. A left side she had significant edema and purulent drainage that I evacuated with [place] suction.   She has a small septal perforation. She has scarring of the middle turbinate and inferior turbinate. There is some scarring to the septum as well. There was a bit of purulent drainage. On the right side similar exam findings with purulent drainage and scarring. Diffuse mucosal edema. ASSESSMENT/PLAN  1. Allergic rhinitis, unspecified seasonality, unspecified trigger  Continue Flonase  2. Nasal septal perforation  Patient was a septal perforation as well as a lot of scarring likely secondary to her history of intranasal drug use. She does have a lot of purulent drainage as well suggesting perhaps chronic sinusitis. I am going to treat her with 3 weeks of antibiotics, Flonase and irrigations and get a posttreatment CT scan to see what else might be going on.  - CT SINUS FOR IMAGE GUIDANCE; Future    3. Nasal congestion  Even if the CT does not show within the sinuses we would have to consider something for the nasal congestion such as lysis of the occasions, turbinate reduction and possibly a septoplasty. The perforation is fairly small so we may be able to fix it during this procedure as well. 4. Deviated septum  May have to consider septoplasty and correction of the septal perforation based on the CT scan when she follows up. 5. Hypertrophy of inferior nasal turbinate  As above    6. Chronic pansinusitis  Did have a lot of purulent drainage today in clinic. I will give her a prolonged course of antibiotics to see if this will clear up. Posttreatment CT scan. - CT SINUS FOR IMAGE GUIDANCE; Future             I have performed a head and neck physical exam personally or was physically present during the key or critical portions of the service. This note was generated completely or in part utilizing Dragon dictation speech recognition software. Occasionally, words are mistranscribed and despite editing, the text may contain inaccuracies due to incorrect word recognition.   If further clarification is needed please contact the office at [phone].

## 2022-06-23 ENCOUNTER — OFFICE VISIT (OUTPATIENT)
Dept: ORTHOPEDIC SURGERY | Age: 42
End: 2022-06-23

## 2022-06-23 ENCOUNTER — TELEPHONE (OUTPATIENT)
Dept: ORTHOPEDIC SURGERY | Age: 42
End: 2022-06-23

## 2022-06-23 ENCOUNTER — HOSPITAL ENCOUNTER (OUTPATIENT)
Dept: PHYSICAL THERAPY | Age: 42
Setting detail: THERAPIES SERIES
Discharge: HOME | End: 2022-06-23
Payer: COMMERCIAL

## 2022-06-23 VITALS — WEIGHT: 235 LBS | HEIGHT: 70 IN | BODY MASS INDEX: 33.64 KG/M2

## 2022-06-23 DIAGNOSIS — M79.672 LEFT FOOT PAIN: ICD-10-CM

## 2022-06-23 DIAGNOSIS — S93.402A MODERATE LEFT ANKLE SPRAIN, INITIAL ENCOUNTER: Primary | ICD-10-CM

## 2022-06-23 PROBLEM — S99.912A INJURY OF LEFT ANKLE: Status: ACTIVE | Noted: 2022-06-23

## 2022-06-23 PROBLEM — I10 ESSENTIAL HYPERTENSION: Status: ACTIVE | Noted: 2022-06-23

## 2022-06-23 PROBLEM — E66.09 OBESITY DUE TO EXCESS CALORIES WITHOUT SERIOUS COMORBIDITY: Status: ACTIVE | Noted: 2022-06-23

## 2022-06-23 PROCEDURE — 4004F PT TOBACCO SCREEN RCVD TLK: CPT | Performed by: ORTHOPAEDIC SURGERY

## 2022-06-23 PROCEDURE — L1902 AFO ANKLE GAUNTLET PRE OTS: HCPCS | Performed by: ORTHOPAEDIC SURGERY

## 2022-06-23 PROCEDURE — 99203 OFFICE O/P NEW LOW 30 MIN: CPT | Performed by: ORTHOPAEDIC SURGERY

## 2022-06-23 PROCEDURE — 97110 THERAPEUTIC EXERCISES: CPT

## 2022-06-23 PROCEDURE — G8417 CALC BMI ABV UP PARAM F/U: HCPCS | Performed by: ORTHOPAEDIC SURGERY

## 2022-06-23 PROCEDURE — G8427 DOCREV CUR MEDS BY ELIG CLIN: HCPCS | Performed by: ORTHOPAEDIC SURGERY

## 2022-06-23 PROCEDURE — 97112 NEUROMUSCULAR REEDUCATION: CPT

## 2022-06-23 PROCEDURE — 97140 MANUAL THERAPY 1/> REGIONS: CPT

## 2022-06-23 NOTE — PROGRESS NOTES
ORTHOPAEDIC NEW PATIENT NOTE    Chief Complaint   Patient presents with    New Patient     left ankle       HPI  6/23/22  39 y.o. female seen for evaluation of left ankle injury/pain:  Onset 6/19 (4 days ago)  Injury/trauma - down in [place] for her sister's wedding, stepping down from the curb, and twisted her left ankle  History of symptoms denies  Pain is located left lateral ankle, dorsal foot  Worse with pressure, WB  Better with rest  Associated with swelling  Numbness and/or tingling = no    I have reviewed and discussed the below pain assessment findings with the patient. Pain Assessment  Location of Pain: Ankle  Location Modifiers: Left,Lateral  Severity of Pain: 7  Quality of Pain: Sharp,Throbbing,Popping,Cracking  Duration of Pain: Persistent  Frequency of Pain: Constant  Date Pain First Started: 05/19/22  Aggravating Factors: Bending,Straightening,Stairs,Walking,Standing,Other (Comment) (side to side motion)  Limiting Behavior: Some  Relieving Factors: Rest,Nsaids,Ice,Other (Comment) (elevation)  Result of Injury: Yes (slippe off curb in [place])  Work-Related Injury: No  Are there other pain locations you wish to document?: No      Review of Systems  I have read over the ROS from the Patient History Form dated on 6/23/22  Pertinent positives include N/A  Rest of 13 point ROS otherwise negative except per HPI, and scanned into the patient's chart under the Media tab. No Known Allergies     Current Outpatient Medications   Medication Sig Dispense Refill    doxycycline hyclate (VIBRA-TABS) 100 MG tablet Take 1 tablet by mouth 2 times daily for 21 days 42 tablet 0    fluticasone (FLONASE) 50 MCG/ACT nasal spray 1 spray by Each Nostril route daily 32 g 1    ULTRAM 50 MG tablet Take 1 tablet by mouth every 8 hours as needed for Pain for up to 11 days. Intended supply: 3 days.  Take lowest dose possible to manage pain 32 tablet 0    valsartan (DIOVAN) 40 MG tablet Take 1 tablet by mouth daily 90 tablet 1    terbinafine (LAMISIL) 250 MG tablet Take 250 mg by mouth daily      ibuprofen (ADVIL;MOTRIN) 800 MG tablet Take 1 tablet by mouth 3 times daily (with meals) 270 tablet 1     No current facility-administered medications for this visit. History reviewed. No pertinent past medical history. Past Surgical History:   Procedure Laterality Date    BREAST REDUCTION SURGERY      X2    KNEE ARTHROSCOPY Left     acl repair       Family History   Problem Relation Age of Onset    Elevated Lipids Mother     Hypertension Father     Cancer Father         lung    Diabetes Brother     Diabetes Maternal Grandfather     Heart Failure Paternal Grandmother        Social History     Socioeconomic History    Marital status:      Spouse name: Not on file    Number of children: 3    Years of education: Not on file    Highest education level: Not on file   Occupational History    Occupation: unemployed   Tobacco Use    Smoking status: Current Every Day Smoker     Types: Cigarettes     Start date: 12/8/2005    Smokeless tobacco: Never Used    Tobacco comment: 2-3 cigarettes   Vaping Use    Vaping Use: Never used   Substance and Sexual Activity    Alcohol use: Yes     Alcohol/week: 2.0 standard drinks     Types: 1 Glasses of wine, 1 Shots of liquor per week     Comment: 3 days/ week    Drug use: Never     Comment: cocaine and MJ in the past    Sexual activity: Yes     Partners: Male   Other Topics Concern    Not on file   Social History Narrative    Lives alone     Social Determinants of Health     Financial Resource Strain: Low Risk     Difficulty of Paying Living Expenses: Not hard at all   Food Insecurity: No Food Insecurity    Worried About [address] in the Last Year: Never true    [address] in the Last Year: Never true   Transportation Needs:     Lack of Transportation (Medical): Not on file    Lack of Transportation (Non-Medical):  Not on file   Physical Activity:  Days of Exercise per Week: Not on file    Minutes of Exercise per Session: Not on file   Stress:     Feeling of Stress : Not on file   Social Connections:     Frequency of Communication with Friends and Family: Not on file    Frequency of Social Gatherings with Friends and Family: Not on file    Attends Religion Services: Not on file    Active Member of [address] [place] [address] [employer] or Organizations: Not on file    Attends Club or Organization Meetings: Not on file    Marital Status: Not on file   Intimate Partner Violence:     Fear of Current or Ex-Partner: Not on file    Emotionally Abused: Not on file    Physically Abused: Not on file    Sexually Abused: Not on file   Housing Stability:     Unable to Pay for Housing in the Last Year: Not on file    Number of [place] in the Last Year: Not on file    Unstable Housing in the Last Year: Not on file        Vitals:    06/23/22 1311   Weight: 235 lb (106.6 kg)   Height: 5' 10\" (1.778 m)       Physical Exam  Constitutional - well-groomed, well-nourished, Body mass index is 33.72 kg/m². Psychiatric - pleasant, normal mood & affect  Cardiovascular - RRR, negative peripheral edema, dorsalis pedis pulse 2+  Respiratory - respirations unlabored, on room air; mask on  Skin - no rashes, wounds, or lesions seen on exposed skin  Neurological - LLE SILT SP/DP/T/sural/saphenous nerve distributions; EHL/FHL/TA/GS intact  Left foot/ankle - no ecchymosis, no deformity   + swelling, worse laterally   TTP ATFL, CFL   TTP dorsal foot (non-focal)   No plantar ecchymosis   Preserved arch   Anterior drawer stable   Preserved AROM DF/PF/inversion/eversion      Imaging:  Images were personally reviewed by myself and discussed with the patient  Left ankle 3 views performed 6/23/22 in clinic - possible small avulsion/chip fracture of the lateral process of the talus. No other fractures identified. Mortise is intact. Lateral soft tissue swelling is seen.     Left foot 3 views performed 6/23/22 in clinic - no fractures identified. Midfoot alignment is maintained. No dislocation. Assessment & Plan:  39 y.o. female who presents with    Diagnosis Orders   1. Moderate left ankle sprain, initial encounter  XR ANKLE LEFT (MIN 3 VIEWS)    Ivory Ankle Brace   2. Left foot pain  XR FOOT LEFT (MIN 3 VIEWS)    Ivory Ankle Brace           Procedures    Ivory Ankle Brace     Patient was prescribed a Ivory Ankle Brace. The left ankle will require stabilization / immobilization from this semi-rigid / rigid orthosis to improve their function. The orthosis will assist in protecting the affected area, provide functional support and facilitate healing. Patient was instructed to progress ambulation weight bearing as tolerated in the device. The patient was educated and fit by a healthcare professional with expert knowledge and specialization in brace application while under the direct supervision of the treating physician. Verbal and written instructions for the use of and application of this item were provided. They were instructed to contact the office immediately should the brace result in increased pain, decreased sensation, increased swelling or worsening of the condition.        Possible avulsion fracture  Nonetheless, treat as lateral ankle sprain  Reassurance given  Symptoms should gradually improve over the next few weeks  Ice, Tylenol/NSAIDs as needed  Elevation  Ankle lacer brace and comfortable supportive shoes  Gradually advance activities as tolerated    Follow-up if symptoms persist or worsen    Gildardo Luna MD

## 2022-06-23 NOTE — FLOWSHEET NOTE
[address] [facility] [place]  Phone: [phone]   Fax:     [phone]    Physical Therapy Treatment Note/ Progress Report:     Date:  2022    Patient Name:  Sandrine Martin    :  1980  MRN: [id number]    Pertinent Medical History:      Medical/Treatment Diagnosis Information:  Diagnosis: M54.32 (ICD-10-CM) - Left sciatic nerve pain  Treatment Diagnosis: back pain, limited mobility, function    Insurance/Certification information:  [address]   Physician Information:  Referring Provider (secondary): Abner Homans, APRN - CNP  Plan of care signed (Y/N): Yes    Date of Patient follow up with Physician:      Progress Report: []  Yes  [x]  No     Date Range for reporting period:  Beginnin/10/2022  Ending:    Progress report due (10 Rx/or 30 days whichever is less): 05     Recertification due (POC duration/ or 90 days whichever is less):     Visit # POC/ Insurance Allowable Auth Needed   2/5 (MSK) auth after 30 per ins []Yes    []No     Functional Scale:       Date Assessed: at eval  Test: FOTO  Score:     Pain level:  /10     History of Injury:Pt here for evaluation for L sided LBP that radiates to her posterior hip and leg down to her knee, not below the knee. Hx of L ACL reconstruction many years ago (but she still feels like LLE has strength deficits). This back/leg pain has been present for about 4-5 yrs. This has been an intermittent problem over the years, but worse lately. No formal treatment in the past. Denies numbness/ tingling. Worse with standing and walking, better with sitting, heat pad, and NSAIDs. She did have a steroid dose pack, which helped for several days, but the pain did return. Denies bowel/ bladder changes.      *pain with walking even a few steps. Limit is about 50% of a block where she would need to sit down.    *past 3 wks avg sleep: 3-4hours of sleep. Recently lost her brother, did not sleep for a week and a half. *initial standing/ walking tolerance: less than 10 steps    SUBJECTIVE:  6/23: Pt notes she slipped over a crub while on vacation and injured L ankle. Pt notes she was treated in the ER where she was told she has a torn ligament and possible hairline fracture. Is suppose to follow up with ortho today. Pt has been ambulating with 2 crutches NWBing since injury. Pt notes she is on a lot of pain medicine for her ankle which is helping out with back. Pt states she felt great after last session and did not have pain until the next day. Pt reports her pain was worse the next day when it returned, 10/10. Standing still remains the worse thing to do for back. Sitting down always gives her some relief. OBJECTIVE:    Observation:    Test measurements:      RESTRICTIONS/PRECAUTIONS:     Exercises/Interventions:     Therapeutic Ex (11580)   Min: 20 Resistance/Reps Notes/Cues             SL Rot x10 R,L     DKTC 5x 10 sec holds    squat >    bridge 10x    Trunk rot - supine 10x Slight c/o L sciatic pain with R rotation   Supine HS stretch with nv glide  Ankle pumps x30 L    Piriformis stretch 3x20 sec L    Manual Intervention (76867) Min: 10'     Lumbar manual SL gap: low to med+ w/ thrusts Relief noted         NMR re-education (28139)   Min: 8     Pelvic tilt hookly 2x10 Pain free ROM   Quad cat camel 10x    Quad UE,LE >         Therapeutic Activity (86439) Min:      Pt edu          Modalities  Min:             Other Therapeutic Activities:  Pt was educated on PT POC, Diagnosis, Prognosis, pathomechanics as well as frequency and duration of scheduling future physical therapy appointments. Time was also taken on this day to answer all patient questions and participation in PT. Reviewed appointment policy in detail with patient and patient verbalized understanding.      Home Exercise Program:     6/10: SL Rot, pelvic tilt (subpainful ROM), improve sleep quality/ time, stay active    Therapeutic Exercise and NMR EXR  [x] (96202) Provided verbal/tactile cueing for activities related to strengthening, flexibility, endurance, ROM  for improvements in proximal hip and core control with self care, mobility, lifting and ambulation. [x] (29475) Provided verbal/tactile cueing for activities related to improving balance, coordination, kinesthetic sense, posture, motor skill, proprioception  to assist with core control in self care, mobility, lifting, and ambulation. Therapeutic Activities:    [x] (79364 or 02211) Provided verbal/tactile cueing for activities related to improving balance, coordination, kinesthetic sense, posture, motor skill, proprioception and motor activation to allow for proper function  with self care and ADLs  [] (41899) Provided training and instruction to the patient for proper core and proximal hip recruitment and positioning with ambulation re-education     Home Exercise Program:    [x] (79596) Reviewed/Progressed HEP activities related to strengthening, flexibility, endurance, ROM of core, proximal hip and LE for functional self-care, mobility, lifting and ambulation   [x] (10288) Reviewed/Progressed HEP activities related to improving balance, coordination, kinesthetic sense, posture, motor skill, proprioception of core, proximal hip and LE for self care, mobility, lifting, and ambulation      Manual Treatments:  PROM / STM / Oscillations-Mobs:  G-I, II, III, IV (PA's, Inf., Post.)  [x] (02875) Provided manual therapy to mobilize proximal hip and LS spine soft tissue/joints for the purpose of modulating pain, promoting relaxation,  increasing ROM, reducing/eliminating soft tissue swelling/inflammation/restriction, improving soft tissue extensibility and allowing for proper ROM for normal function with self care, mobility, lifting and ambulation.      Approval Dates:  CPT Code Units Approved Units Used  Date Updated:                     Charges:  Timed Code Treatment Minutes: 38   Total Treatment Minutes: 38     [] EVAL (LOW) 08129 (typically 20 minutes face-to-face)  [] EVAL (MOD) 53289 (typically 30 minutes face-to-face)  [] EVAL (HIGH) 85459 (typically 45 minutes face-to-face)  [] RE-EVAL     [x] EU(61887) x     [] Dry needle 1 or 2 Muscles (80271)  [x] NMR (15881) x     [] Dry needle 3+ Muscles (63729)  [x] Manual (72346) x     [] Ultrasound (39927) x  [] TA (80765) x     [] Mech Traction (85234)  [] ES(attended) (32436)     [] ES (un) (67303):   [] Vasopump (81974) [] Ionto (90262)   [] Other:      Approval Dates:  CPT Code Units Approved Units Used  Date Updated:                     GOALS:  Patient stated goal: able to walk community distances w/o LBP. [] Progressing: [] Met: [] Not Met: [] Adjusted  Therapist goals for Patient:   Short Term Goals: To be achieved in: 2 weeks  1. Independent in HEP and progression per patient tolerance, in order to prevent re-injury. [] Progressing: [] Met: [] Not Met: [] Adjusted  2. Patient will have a decrease in pain to facilitate improvement in movement, function, and ADLs as indicated by Functional Deficits. [] Progressing: [] Met: [] Not Met: [] Adjusted    Long Term Goals: To be achieved in: up to 8 weeks  1. Increase FOTO functional outcome score from 41 to 57  to assist with reaching prior level of function. [] Progressing: [] Met: [] Not Met: [] Adjusted  2. Patient will demonstrate increased AROM to WNL, good LS mobility, good hip ROM to allow for proper joint functioning as indicated by patients Functional Deficits. [] Progressing: [] Met: [] Not Met: [] Adjusted  3. Patient will demonstrate an increase in Strength to good proximal hip and core activation to allow for proper functional mobility as indicated by patients Functional Deficits. [] Progressing: [] Met: [] Not Met: [] Adjusted  4.  Patient will return to all functional activities without increased symptoms or restriction. [] Progressing: [] Met: [] Not Met: [] Adjusted    ASSESSMENT:   Patient with good tolerance to today's session. Limited exercises to avoid involvement of L ankle due to possible fracture. Pt reports relief of pain with gapping of lumbar joints. Pt may benefit from trial of lumbar traction due to relief of pain with spinal unloading. Treatment/Activity Tolerance:  [x] Patient tolerated treatment well [] Patient limited by fatique  [] Patient limited by pain  [] Patient limited by other medical complications  [] Other:     Overall Progression Towards Functional goals/ Treatment Progress Update:  [] Patient is progressing as expected towards functional goals listed. [] Progression is slowed due to complexities/Impairments listed. [] Progression has been slowed due to co-morbidities. [x] Plan just implemented, too soon to assess goals progression <30days   [] Goals require adjustment due to lack of progress  [] Patient is not progressing as expected and requires additional follow up with physician  [] Other:    Prognosis for POC: [x] Good [] Fair  [] Poor    Patient requires continued skilled intervention: [x] Yes  [] No    PLAN:   [x] Continue per plan of care [] Alter current plan (see comments)  [] Plan of care initiated [] Hold pending MD visit [] Discharge    Electronically signed by: Lalitha Baer PT , OMT-C,  [id number]        Note: If patient does not return for scheduled/recommended follow up visits, this note will serve as a discharge from care along with the most recent update on progress.

## 2022-06-29 ENCOUNTER — HOSPITAL ENCOUNTER (OUTPATIENT)
Dept: PHYSICAL THERAPY | Age: 42
Setting detail: THERAPIES SERIES
Discharge: HOME | End: 2022-06-29
Payer: COMMERCIAL

## 2022-06-29 PROCEDURE — 97140 MANUAL THERAPY 1/> REGIONS: CPT

## 2022-06-29 PROCEDURE — 97112 NEUROMUSCULAR REEDUCATION: CPT

## 2022-06-29 PROCEDURE — 97110 THERAPEUTIC EXERCISES: CPT

## 2022-06-29 NOTE — FLOWSHEET NOTE
[address] [facility]. [address]  Phone: [phone]   Fax:     [phone]    Physical Therapy Treatment Note/ Progress Report:     Date:  2022    Patient Name:  Bela Pham    :  1980  MRN: [id number]    Pertinent Medical History:      Medical/Treatment Diagnosis Information:  Diagnosis: M54.32 (ICD-10-CM) - Left sciatic nerve pain  Treatment Diagnosis: back pain, limited mobility, function    Insurance/Certification information:  [address]   Physician Information:  Referring Provider (secondary): IVCKI Terrell CNP  Plan of care signed (Y/N): Yes    Date of Patient follow up with Physician:      Progress Report: []  Yes  [x]  No     Date Range for reporting period:  Beginnin/10/2022  Ending:    Progress report due (10 Rx/or 30 days whichever is less):      Recertification due (POC duration/ or 90 days whichever is less):     Visit # POC/ Insurance Allowable Auth Needed   3/ (MSK) auth after 30 per ins, all codes billable []Yes    []No     Functional Scale:       Date Assessed: at eval  Test: FOTO  Score:     Pain level:  /10     History of Injury:Pt here for evaluation for L sided LBP that radiates to her posterior hip and leg down to her knee, not below the knee. Hx of L ACL reconstruction many years ago (but she still feels like LLE has strength deficits). This back/leg pain has been present for about 4-5 yrs. This has been an intermittent problem over the years, but worse lately. No formal treatment in the past. Denies numbness/ tingling. Worse with standing and walking, better with sitting, heat pad, and NSAIDs. She did have a steroid dose pack, which helped for several days, but the pain did return. Denies bowel/ bladder changes.      *pain with walking even a few steps. Limit is about 50% of a block where she would need to sit down.    *past 3 wks avg sleep: 3-4hours of sleep. Recently lost her brother, did not sleep for a week and a half. *initial standing/ walking tolerance: less than 10 steps    SUBJECTIVE:  6/23: Pt notes she slipped over a crub while on vacation and injured L ankle. Pt notes she was treated in the ER where she was told she has a torn ligament and possible hairline fracture. Is suppose to follow up with ortho today. Pt has been ambulating with 2 crutches NWBing since injury. Pt notes she is on a lot of pain medicine for her ankle which is helping out with back. Pt states she felt great after last session and did not have pain until the next day. Pt reports her pain was worse the next day when it returned, 10/10. Standing still remains the worse thing to do for back. Sitting down always gives her some relief.     6/29: pt here 15+ min late. She did have good relief after first visit, familiar pain returned the next day, but subsequent days, she had continued improvement. During the out of town wedding, she only had a few instances of pain, despite dancing and lots of standing/ walking. She did sprain her ankle/ avulsion fx once she returned home. Today she is amb w/ ankle brace and 1 crutch and she is taking ibuprofen 800mg 3x/day for her ankle which she thinks may be helping her back too. She reports her back is 25-50% better at this point. She denies any specific reports of back pain in the past few days.      OBJECTIVE:    Observation:    Test measurements:      ROM  6/29/2022       Trunk AROM   Flex wnl  Ext wnl  ROT: wfl       Hip PROM - supine  WNL bilat, non provocative                                  Strength                                                      TESTS/ OTHER         SLR  neg       Lumbar quadrant  Familiar facet pain on L       PA spring  Pain/ hypomobile L3-S1 on L                                   RESTRICTIONS/PRECAUTIONS:     Exercises/Interventions:     Therapeutic Ex (29416)   Min: 15' Resistance/Reps Notes/Cues             SL Rot x10 (open L) Hand on ribs   DKTC    squat >         Trunk rot - supine Slight c/o L sciatic pain with R rotation   Supine HS stretch with nv glide           (Ankle ex's:) ST gastroc str 15sec x 3  NBOS balance; eyes closed: 20sec x 1 HEP        Pt edu Updated HEP; see below    Manual Intervention (20863) Min: 10'     Lumbar manual SL gap: open L4-S1 w/ M-HVLA thrusts No cavitations: pt reports \"good stretch\"        NMR re-education (76921)   Min: 12'     Pelvic tilt PPT to neutral:   hookly 2x10 Cue to avoid Ant pelvic tilt which was painful   bridge PPT>bridge 2x10 Mod fatigue       Quad cat camel resume   Quad UE,LE > add                       Therapeutic Activity (40331) Min: 5'     Pt edu Pathology: facet dysfunction, Role of PT, motor control, use of lumbar manipulations in short term relief, pt edu for use of crutch to moderate WBing and facilitate as normal gait as possible. Modalities  Min:             Other Therapeutic Activities:  Pt was educated on PT POC, Diagnosis, Prognosis, pathomechanics as well as frequency and duration of scheduling future physical therapy appointments. Time was also taken on this day to answer all patient questions and participation in PT. Reviewed appointment policy in detail with patient and patient verbalized understanding. Home Exercise Program:     6/10: SL Rot, pelvic tilt (subpainful ROM), improve sleep quality/ time, stay active    6/29: Access Code: [email or web address]  URL: [email or web address]  Date: 06/29/2022  Prepared by: Paola Ramos    Exercises  Supine Posterior Pelvic Tilt - 2-3 x daily - 4-6 x weekly - 2-3 sets - 10-15 reps  Supine Bridge with Spinal Articulation - 2-3 x daily - 4-6 x weekly - 2-3 sets - 10-15 reps  Gastroc Stretch on Wall - 2-3 x daily - 4-6 x weekly - 2-3 sets - 10-15 reps  Romberg Stance with Eyes Closed - 2-3 x daily - 4-6 x weekly - 2-3 sets - 10-15 reps      Therapeutic Exercise and NMR EXR  [x] (66683) Provided verbal/tactile cueing for activities related to strengthening, flexibility, endurance, ROM  for improvements in proximal hip and core control with self care, mobility, lifting and ambulation. [x] (38111) Provided verbal/tactile cueing for activities related to improving balance, coordination, kinesthetic sense, posture, motor skill, proprioception  to assist with core control in self care, mobility, lifting, and ambulation. Therapeutic Activities:    [x] (80954 or 78523) Provided verbal/tactile cueing for activities related to improving balance, coordination, kinesthetic sense, posture, motor skill, proprioception and motor activation to allow for proper function  with self care and ADLs  [] (33238) Provided training and instruction to the patient for proper core and proximal hip recruitment and positioning with ambulation re-education     Home Exercise Program:    [x] (34554) Reviewed/Progressed HEP activities related to strengthening, flexibility, endurance, ROM of core, proximal hip and LE for functional self-care, mobility, lifting and ambulation   [x] (89696) Reviewed/Progressed HEP activities related to improving balance, coordination, kinesthetic sense, posture, motor skill, proprioception of core, proximal hip and LE for self care, mobility, lifting, and ambulation      Manual Treatments:  PROM / STM / Oscillations-Mobs:  G-I, II, III, IV (PA's, Inf., Post.)  [x] (70081) Provided manual therapy to mobilize proximal hip and LS spine soft tissue/joints for the purpose of modulating pain, promoting relaxation,  increasing ROM, reducing/eliminating soft tissue swelling/inflammation/restriction, improving soft tissue extensibility and allowing for proper ROM for normal function with self care, mobility, lifting and ambulation.      Approval Dates:  CPT Code Units Approved Units Used  Date Updated:                     Charges:  Timed Code Treatment Minutes: 40   Total Treatment Minutes: 40     [] EVAL (LOW) 97962 (typically 20 minutes face-to-face)  [] EVAL (MOD) 46124 (typically 30 minutes face-to-face)  [] EVAL (HIGH) 71962 (typically 45 minutes face-to-face)  [] RE-EVAL     [x] HW(23711) x     [] Dry needle 1 or 2 Muscles (49227)  [x] NMR (89805) x     [] Dry needle 3+ Muscles (76429)  [x] Manual (37388) x     [] Ultrasound (40152) x  [] TA (47679) x     [] Mech Traction (77778)  [] ES(attended) (40181)     [] ES (un) (34119):   [] Vasopump (51972) [] Ionto (25567)   [] Other:      Approval Dates:  CPT Code Units Approved Units Used  Date Updated:                     GOALS:  Patient stated goal: able to walk community distances w/o LBP. [] Progressing: [] Met: [] Not Met: [] Adjusted  Therapist goals for Patient:   Short Term Goals: To be achieved in: 2 weeks  1. Independent in HEP and progression per patient tolerance, in order to prevent re-injury. [] Progressing: [] Met: [] Not Met: [] Adjusted  2. Patient will have a decrease in pain to facilitate improvement in movement, function, and ADLs as indicated by Functional Deficits. [] Progressing: [] Met: [] Not Met: [] Adjusted    Long Term Goals: To be achieved in: up to 8 weeks  1. Increase FOTO functional outcome score from 41 to 57  to assist with reaching prior level of function. [] Progressing: [] Met: [] Not Met: [] Adjusted  2. Patient will demonstrate increased AROM to WNL, good LS mobility, good hip ROM to allow for proper joint functioning as indicated by patients Functional Deficits. [] Progressing: [] Met: [] Not Met: [] Adjusted  3. Patient will demonstrate an increase in Strength to good proximal hip and core activation to allow for proper functional mobility as indicated by patients Functional Deficits. [] Progressing: [] Met: [] Not Met: [] Adjusted  4. Patient will return to all functional activities without increased symptoms or restriction.    [] Progressing: [] Met: [] Not Met: [] Adjusted    ASSESSMENT:  Pt shania session well. Mild familiar L sided LBP w/ end range anterior pelvic tilts, so adjusted ROM which helped a great deal. Fatigues quickly w/ hookly bridges. Good stretch also felt with calf str, no incr ankle pain during session. Educated on use of crutch and avoid limping to optimize her recover. Treatment/Activity Tolerance:  [x] Patient tolerated treatment well [] Patient limited by fatique  [] Patient limited by pain  [] Patient limited by other medical complications  [] Other:     Overall Progression Towards Functional goals/ Treatment Progress Update:  [] Patient is progressing as expected towards functional goals listed. [] Progression is slowed due to complexities/Impairments listed. [] Progression has been slowed due to co-morbidities. [x] Plan just implemented, too soon to assess goals progression <30days   [] Goals require adjustment due to lack of progress  [] Patient is not progressing as expected and requires additional follow up with physician  [] Other:    Prognosis for POC: [x] Good [] Fair  [] Poor    Patient requires continued skilled intervention: [x] Yes  [] No    PLAN:   6/29: 1 more visit this week, then try 1x/wk for the next couple weeks after that. [x] Continue per plan of care [] Alter current plan (see comments)  [] Plan of care initiated [] Hold pending MD visit [] Discharge    Electronically signed by: Gary Graves PT , DPT, OCS #[id number]          Note: If patient does not return for scheduled/recommended follow up visits, this note will serve as a discharge from care along with the most recent update on progress.

## 2022-07-01 ENCOUNTER — HOSPITAL ENCOUNTER (OUTPATIENT)
Dept: PHYSICAL THERAPY | Age: 42
Setting detail: THERAPIES SERIES
Discharge: HOME | End: 2022-07-01
Payer: COMMERCIAL

## 2022-07-01 PROCEDURE — 97110 THERAPEUTIC EXERCISES: CPT

## 2022-07-01 PROCEDURE — 97112 NEUROMUSCULAR REEDUCATION: CPT

## 2022-07-01 PROCEDURE — 97530 THERAPEUTIC ACTIVITIES: CPT

## 2022-07-01 NOTE — FLOWSHEET NOTE
[address] [facility] [place]. [place] [address]  Phone: [phone]   Fax:     [phone]    Physical Therapy Treatment Note/ Progress Report:     Date:  2022    Patient Name:  Raphael Gallagher    :  1980  MRN: [id number]    Pertinent Medical History:      Medical/Treatment Diagnosis Information:  Diagnosis: M54.32 (ICD-10-CM) - Left sciatic nerve pain  Treatment Diagnosis: back pain, limited mobility, function    Insurance/Certification information:  [address]   Physician Information:  Referring Provider (secondary): VICKI Hernandez CNP  Plan of care signed (Y/N): Yes    Date of Patient follow up with Physician:      Progress Report: []  Yes  [x]  No     Date Range for reporting period:  Beginnin/10/2022  Ending:    Progress report due (10 Rx/or 30 days whichever is less):      Recertification due (POC duration/ or 90 days whichever is less):     Visit # POC/ Insurance Allowable Auth Needed    (MSK) auth after 30 per ins, all codes billable []Yes    []No     Functional Scale:       Date Assessed: at eval  Test: FOTO  Score:   : 83pts    Pain level:  /10     History of Injury:Pt here for evaluation for L sided LBP that radiates to her posterior hip and leg down to her knee, not below the knee. Hx of L ACL reconstruction many years ago (but she still feels like LLE has strength deficits). This back/leg pain has been present for about 4-5 yrs. This has been an intermittent problem over the years, but worse lately. No formal treatment in the past. Denies numbness/ tingling. Worse with standing and walking, better with sitting, heat pad, and NSAIDs. She did have a steroid dose pack, which helped for several days, but the pain did return. Denies bowel/ bladder changes.      *pain with walking even a few steps. Limit is about 50% of a block where she would need to sit down. *past 3 wks avg sleep: 3-4hours of sleep. Recently lost her brother, did not sleep for a week and a half. *initial standing/ walking tolerance: less than 10 steps    SUBJECTIVE:  6/23: Pt notes she slipped over a crub while on vacation and injured L ankle. Pt notes she was treated in the ER where she was told she has a torn ligament and possible hairline fracture. Is suppose to follow up with ortho today. Pt has been ambulating with 2 crutches NWBing since injury. Pt notes she is on a lot of pain medicine for her ankle which is helping out with back. Pt states she felt great after last session and did not have pain until the next day. Pt reports her pain was worse the next day when it returned, 10/10. Standing still remains the worse thing to do for back. Sitting down always gives her some relief.     6/29: pt here 15+ min late. She did have good relief after first visit, familiar pain returned the next day, but subsequent days, she had continued improvement. During the out of town wedding, she only had a few instances of pain, despite dancing and lots of standing/ walking. She did sprain her ankle/ avulsion fx once she returned home. Today she is amb w/ ankle brace and 1 crutch and she is taking ibuprofen 800mg 3x/day for her ankle which she thinks may be helping her back too. She reports her back is 25-50% better at this point. She denies any specific reports of back pain in the past few days. 7/1: She states after last visit felt good. She went shopping nearly all day and had no back pain. She only had minimial problems with her L ankle (still wearing ankle support). No crutch today. Her back felt good this morning, and about mid day, just before coming here, had just a very slight twinge for a very short period of time. She was able to carry a case of water the other day w/o problems. She is no longer taking tramadol or nsaids. Biggest limitation is L ankle.  She hopes to be able to eventually get back to the gym and possibly running. OBJECTIVE:    Observation:    Test measurements:      ROM  6/29/2022 7/1      Trunk AROM   Flex wnl  Ext wnl  ROT: wfl Full ROM all planes      Hip PROM - supine  WNL bilat, non provocative                                  Strength                                                      TESTS/ OTHER         SLR  neg       Lumbar quadrant  Familiar facet pain on L       PA spring  Pain/ hypomobile L3-S1 on L       SLS   R>10sec  L<6sec                   RESTRICTIONS/PRECAUTIONS:     Exercises/Interventions:     Therapeutic Ex (54309)   Min: 16' Resistance/ Reps Notes/Cues             SL Rot x10 L,R Hand on ribs   DKTC    squat Deadlift/ squat 8lb x 12 Mild cues        Trunk rot - supine Slight c/o L sciatic pain with R rotation                                      Pt edu Updated HEP; see below Ex's sent via text per pt request   Manual Intervention (06115) Min: 0     Lumbar manual No cavitations: pt reports \"good stretch\"        NMR re-education (51021)   Min: 10'     Pelvic tilt Cue to avoid Ant pelvic tilt which was painful   bridge Mod fatigue       Quad cat camel 10x resume   Quad UE,LE UE/LE 5s x 8 Cues for avoiding excessive lordosis                       Therapeutic Activity (24864) Min: 15'     Pt edu Pathology; LBP/ ankle, timeframe for ankle recovery, pt edu for graded progression of activity w/ ankle problem, role of PT in mgmt acute LBP, role of HEP to manage flare ups of LBP. Modalities  Min:             Other Therapeutic Activities:  Pt was educated on PT POC, Diagnosis, Prognosis, pathomechanics as well as frequency and duration of scheduling future physical therapy appointments. Time was also taken on this day to answer all patient questions and participation in PT. Reviewed appointment policy in detail with patient and patient verbalized understanding.      Home Exercise Program:     6/10: SL Rot, pelvic tilt (subpainful ROM), improve sleep quality/ time, stay active    6/29: Access Code: [email or web address]  URL: [email or web address]  Date: 06/29/2022  Prepared by: Joellyn McClure    Exercises  Supine Posterior Pelvic Tilt - 2-3 x daily - 4-6 x weekly - 2-3 sets - 10-15 reps  Supine Bridge with Spinal Articulation - 2-3 x daily - 4-6 x weekly - 2-3 sets - 10-15 reps  Gastroc Stretch on Wall - 2-3 x daily - 4-6 x weekly - 2-3 sets - 10-15 reps  Romberg Stance with Eyes Closed - 2-3 x daily - 4-6 x weekly - 2-3 sets - 10-15 reps    7/1/22:   Access Code: [email or web address]  URL: [email or web address]  Date: 07/01/2022  Prepared by: Joellyn McClure    Exercises  Cat Cow - 2-3 x daily - 4-6 x weekly - 2-3 sets - 10-15 reps  Bird Dog - 2-3 x daily - 4-6 x weekly - 10-15 reps - 5-15sec hold  Goblet Squat with Kettlebell - 2-3 x daily - 4-6 x weekly - 2-3 sets - 10-15 reps  Tandem Stance with Support - 2-3 x daily - 4-6 x weekly - 3-5 reps - 10-20sec hold      Therapeutic Exercise and NMR EXR  [x] (65738) Provided verbal/tactile cueing for activities related to strengthening, flexibility, endurance, ROM  for improvements in proximal hip and core control with self care, mobility, lifting and ambulation. [x] (59434) Provided verbal/tactile cueing for activities related to improving balance, coordination, kinesthetic sense, posture, motor skill, proprioception  to assist with core control in self care, mobility, lifting, and ambulation.      Therapeutic Activities:    [x] (64380 or 28281) Provided verbal/tactile cueing for activities related to improving balance, coordination, kinesthetic sense, posture, motor skill, proprioception and motor activation to allow for proper function  with self care and ADLs  [] (06100) Provided training and instruction to the patient for proper core and proximal hip recruitment and positioning with ambulation re-education     Home Exercise Program:    [x] (56145) Reviewed/Progressed HEP activities related to strengthening, flexibility, endurance, ROM of core, proximal hip and LE for functional self-care, mobility, lifting and ambulation   [x] (28171) Reviewed/Progressed HEP activities related to improving balance, coordination, kinesthetic sense, posture, motor skill, proprioception of core, proximal hip and LE for self care, mobility, lifting, and ambulation      Manual Treatments:  PROM / STM / Oscillations-Mobs:  G-I, II, III, IV (PA's, Inf., Post.)  [x] (63106) Provided manual therapy to mobilize proximal hip and LS spine soft tissue/joints for the purpose of modulating pain, promoting relaxation,  increasing ROM, reducing/eliminating soft tissue swelling/inflammation/restriction, improving soft tissue extensibility and allowing for proper ROM for normal function with self care, mobility, lifting and ambulation. Approval Dates:  CPT Code Units Approved Units Used  Date Updated:                     Charges:  Timed Code Treatment Minutes: 41   Total Treatment Minutes: 41     [] EVAL (LOW) 27527 (typically 20 minutes face-to-face)  [] EVAL (MOD) 64092 (typically 30 minutes face-to-face)  [] EVAL (HIGH) 78572 (typically 45 minutes face-to-face)  [] RE-EVAL     [x] EM(01596) x     [] Dry needle 1 or 2 Muscles (82750)  [x] NMR (12440) x     [] Dry needle 3+ Muscles (74975)  [x] Manual (47513) x     [] Ultrasound (14476) x  [] TA (14475) x     [] Mech Traction (24930)  [] ES(attended) (94736)     [] ES (un) (10611):   [] Vasopump (47277) [] Ionto (22690)   [] Other:      Approval Dates:  CPT Code Units Approved Units Used  Date Updated:                     GOALS:  Patient stated goal: able to walk community distances w/o LBP. [x] Progressing: [] Met: [] Not Met: [] Adjusted  Therapist goals for Patient:   Short Term Goals: To be achieved in: 2 weeks  1. Independent in HEP and progression per patient tolerance, in order to prevent re-injury. [x] Progressing: [] Met: [] Not Met: [] Adjusted  2.  Patient will have a decrease in pain to facilitate improvement in movement, function, and ADLs as indicated by Functional Deficits. [x] Progressing: [] Met: [] Not Met: [] Adjusted    Long Term Goals: To be achieved in: up to 8 weeks  1. Increase FOTO functional outcome score from 41 to 57  to assist with reaching prior level of function. [x] Progressing: [] Met: [] Not Met: [] Adjusted  2. Patient will demonstrate increased AROM to WNL, good LS mobility, good hip ROM to allow for proper joint functioning as indicated by patients Functional Deficits. [x] Progressing: [] Met: [] Not Met: [] Adjusted  3. Patient will demonstrate an increase in Strength to good proximal hip and core activation to allow for proper functional mobility as indicated by patients Functional Deficits. [x] Progressing: [] Met: [] Not Met: [] Adjusted  4. Patient will return to all functional activities without increased symptoms or restriction. [x] Progressing: [] Met: [] Not Met: [] Adjusted    ASSESSMENT:  Pt shania session quite well. Held manual today due to patient reporting feeling >90% better. Cavitation noted w/ R lumbar gapping self stretch. Mild deficits w/ motor control w/ bird dog; LLE on table. Mild ankle DF restriction noted with squats, but not severely painful. Attempted SLS, but mild to mod sore. Lots of education about safe progression of activity for ankle - cautioned her not to progress too quickly. Need to restore lower quarter and lower back strength prior to resuming running or heavy gym program.     I do feel she would benefit from a little more PT to progress her back strength/ function a bit further as well as incorporate L ankle and general lower quarter strength as appropriate to allow patient to meet her goals (gym workout/ running on TM).      Treatment/Activity Tolerance:  [x] Patient tolerated treatment well [] Patient limited by fatique  [] Patient limited by pain  [] Patient limited by other medical complications  [] Other: Overall Progression Towards Functional goals/ Treatment Progress Update:  [] Patient is progressing as expected towards functional goals listed. [] Progression is slowed due to complexities/Impairments listed. [] Progression has been slowed due to co-morbidities. [x] Plan just implemented, too soon to assess goals progression <30days   [] Goals require adjustment due to lack of progress  [] Patient is not progressing as expected and requires additional follow up with physician  [] Other:    Prognosis for POC: [x] Good [] Fair  [] Poor    Patient requires continued skilled intervention: [x] Yes  [] No    PLAN:   6/29: 1 more visit this week, then try 1x/wk for the next couple weeks after that. 7/1: mutually decided to skip next visit due to great progress at this point and pts understanding of HEP. At least 1 more visit for advancement of ankle/ back/ lower quarter as appropriate. [x] Continue per plan of care [] Alter current plan (see comments)  [] Plan of care initiated [] Hold pending MD visit [] Discharge    Electronically signed by: Alanna Perez PT , DPT, OCS #[id number]          Note: If patient does not return for scheduled/recommended follow up visits, this note will serve as a discharge from care along with the most recent update on progress.

## 2022-07-06 ENCOUNTER — APPOINTMENT (OUTPATIENT)
Dept: PHYSICAL THERAPY | Age: 42
End: 2022-07-06
Payer: COMMERCIAL

## 2022-07-08 ENCOUNTER — APPOINTMENT (OUTPATIENT)
Dept: PHYSICAL THERAPY | Age: 42
End: 2022-07-08
Payer: COMMERCIAL

## 2022-07-19 ENCOUNTER — HOSPITAL ENCOUNTER (OUTPATIENT)
Dept: PHYSICAL THERAPY | Age: 42
Setting detail: THERAPIES SERIES
Discharge: HOME | End: 2022-07-19
Payer: COMMERCIAL

## 2022-07-19 NOTE — FLOWSHEET NOTE
[address] [facility] [place]  [place] [address]  Phone: [phone]   Fax:     [phone]    Physical Therapy  Cancellation/No-show Note  Patient Name:  Jeanne Agrawal  :  1980   Date:  2022  Cancelled visits to date: 1  No-shows to date: 1    Patient status for today's appointment patient:  [x]  Cancelled  []  Rescheduled appointment  []  No-show     Reason given by patient:  []  Patient ill  []  Conflicting appointment  []  No transportation    [x]  Conflict with work  []  No reason given  []  Other:     Comments:      Phone call information:   []  Phone call made today to patient at _ time at number provided:      []  Patient answered, conversation as follows:    []  Patient did not answer, message left as follows:   []  Phone call not made today    Electronically signed by:  Sandoval Maddox, PT

## 2022-07-24 NOTE — PROGRESS NOTES
Tierra Crabtree (:  1980) is a 39 y.o. female,Established patient, here for evaluation of the following chief complaint(s):  Follow-up and Referral - General (Dermatologist )         ASSESSMENT/PLAN:  1. Essential hypertension- home and office BP readings elevated  -     chlorthalidone (HYGROTON) 25 MG tablet; Take 1 tablet by mouth in the morning., Disp-90 tablet, R-1Normal  -Monitor BP and send to Provider through My chart  -continue Diovan  -decrease sodium in diet  2. Injury of left ankle, subsequent encounter  -Followed by Ortho  3. Obesity due to excess calories without serious comorbidity, unspecified classification  -Make life style changes:  Exercise for at least 30 minutes 3 times weekly. Decrease fatty, fried, fast and processed foods. 4. Encounter for vitamin deficiency screening  -     Vitamin D 25 Hydroxy; Future  -     Vitamin B12 & Folate; Future  5. Cyst of breast, unspecified laterality  -     Candice Harden MD, Breast Surgery, [place]  6. Hyperpigmentation of skin- contact vs allergic dermatitis   - Apply     hydrocortisone 0.5 % cream; Apply topically 2 times daily. , Disp-15 g, R-0, Normal  -Change mask frequently or wash frequently  -refrain from wearing makeup when wearing mask    Return in about 3 months (around 10/25/2022) for HTN. Subjective   SUBJECTIVE/OBJECTIVE:  HPI  Hypertension  This is a new problem. Pertinent negatives include no anxiety, chest pain, neck pain, palpitations, PND or sweats. Risk factors for coronary artery disease include obesity. Past treatments include nothing. Intermittently taking medication due to stomach upset. 159-190/- home BP readings    44 yo brother recently , whom had diabetes and hypertension. Hyperpigmentation on face and breast. Hyperpigmentation on breast in same area as mask. Dark pimples on breast. Cyst on breast which intermittently drain, no drainage today.   S/p mammogram no abnormalities. Has upcoming dermatology appointment    Back Pain  This is a chronic problem. The current episode started more than 1 year ago. The pain is present in the gluteal. The quality of the pain is described as aching. The pain radiates to the left thigh. The pain is mild. The symptoms are aggravated by lying down and bending. Pertinent negatives include no abdominal pain, bladder incontinence, bowel incontinence, chest pain, dysuria, fever, headaches, numbness, pelvic pain or weakness. She has tried nothing for the symptoms. Has had intermittent pain for 5 years, not taking any medication. Physical therapy is effective for 2 days. Pain reoccurs if not performing home exercises. Most recent sinus infection in April, evaluated at Urgent Care, treated with antibiotics, effective. More frequent since relocating from [place]. Ankle Pain  Left ankle pain started  after a slip off curb in [place]. Evaluated at facility there and Imaging revealed torn ligament and sprain. Drove home from [place], pain 9/10, ambulating with crutch. Elevating and applying ice. Evaluated by Ortho, diagnosed with sprain, has support brace in place. Obesity  General weight loss/lifestyle modification strategies discussed (elicit support from others; identify saboteurs; non-food rewards, etc). Goals:  -Eat 4-5 times daily  -Avoid high fat and high sugar foods  -Include protein with all meals and snacks  -Avoid carbonation and caffeine  -Avoid calorie containing beverages  -Increase physical activity as tolerated     Overweight/Obesity related to lack of exercise, sedentary lifestyle, unhealthy eating habits, and unsuccessful diet attempts as evidenced by BMI. Review of Systems   Constitutional:  Negative for activity change and fever. HENT:  Negative for congestion. Eyes:  Negative for visual disturbance. Respiratory:  Negative for chest tightness.     Cardiovascular:  Negative for chest pain, palpitations and leg swelling. Gastrointestinal:  Negative for abdominal pain, constipation and diarrhea. Endocrine: Negative for polyuria. Genitourinary:  Negative for dysuria. Musculoskeletal:  Negative for arthralgias, myalgias and neck pain. Skin:  Negative for rash. Neurological:  Negative for dizziness and light-headedness. Psychiatric/Behavioral:  Negative for agitation, decreased concentration and sleep disturbance. The patient is not nervous/anxious. Objective    height is 5' 10\" (1.778 m) and weight is 240 lb (108.9 kg). Her temperature is 97.9 °F (36.6 °C). Her blood pressure is 137/94 (abnormal) and her pulse is 104 (abnormal). Her oxygen saturation is 99%. BP Readings from Last 3 Encounters:   07/25/22 (!) 137/94   06/22/22 (!) 179/121   06/22/22 (!) 149/101      Wt Readings from Last 3 Encounters:   07/25/22 240 lb (108.9 kg)   06/23/22 235 lb (106.6 kg)   06/22/22 250 lb (113.4 kg)      Patient Active Problem List   Diagnosis    De Quervain's disease (radial styloid tenosynovitis)    Left sciatic nerve pain    Recurrent sinus infections    Essential hypertension    Injury of left ankle    Obesity due to excess calories without serious comorbidity    Hyperpigmentation of skin    Cyst of breast     Patient Active Problem List   Diagnosis    De Quervain's disease (radial styloid tenosynovitis)    Left sciatic nerve pain    Recurrent sinus infections    Essential hypertension    Injury of left ankle    Obesity due to excess calories without serious comorbidity    Hyperpigmentation of skin    Cyst of breast    No results found for: WBC, HGB, HCT, MCV, PLT No results found for: NA, K, CL, CO2, BUN, CREATININE, GLUCOSE, CALCIUM, PROT, LABALBU, BILITOT, ALKPHOS, AST, ALT, LABGLOM, GFRAA, AGRATIO, GLOB     Physical Exam  Vitals reviewed. Constitutional:       Appearance: She is well-developed. HENT:      Head: Normocephalic.         Comments: Hyperpigmentation of face     Right Ear: Hearing and external ear normal.      Left Ear: Hearing and external ear normal.      Nose: Nose normal.   Eyes:      General: Lids are normal.   Cardiovascular:      Rate and Rhythm: Normal rate and regular rhythm. Heart sounds: Normal heart sounds, S1 normal and S2 normal.   Pulmonary:      Effort: Pulmonary effort is normal.      Breath sounds: Normal breath sounds. Musculoskeletal:         General: Normal range of motion. Skin:     General: Skin is warm and dry. Findings: No rash. Neurological:      Mental Status: She is alert and oriented to person, place, and time. GCS: GCS eye subscore is 4. GCS verbal subscore is 5. GCS motor subscore is 6. Psychiatric:         Speech: Speech normal.         Behavior: Behavior normal. Behavior is cooperative. On this date 7/25/2022 I have spent 15 minutes reviewing previous notes, test results and face to face with the patient discussing the diagnosis and importance of compliance with the treatment plan as well as documenting on the day of the visit. An electronic signature was used to authenticate this note.     --Norvel Cheadle, APRN - CNP

## 2022-07-25 ENCOUNTER — OFFICE VISIT (OUTPATIENT)
Dept: PRIMARY CARE CLINIC | Age: 42
End: 2022-07-25
Payer: COMMERCIAL

## 2022-07-25 VITALS
OXYGEN SATURATION: 99 % | DIASTOLIC BLOOD PRESSURE: 94 MMHG | HEART RATE: 104 BPM | WEIGHT: 240 LBS | SYSTOLIC BLOOD PRESSURE: 137 MMHG | TEMPERATURE: 97.9 F | BODY MASS INDEX: 34.36 KG/M2 | HEIGHT: 70 IN

## 2022-07-25 DIAGNOSIS — I10 ESSENTIAL HYPERTENSION: Primary | ICD-10-CM

## 2022-07-25 DIAGNOSIS — S99.912D INJURY OF LEFT ANKLE, SUBSEQUENT ENCOUNTER: ICD-10-CM

## 2022-07-25 DIAGNOSIS — N60.09 CYST OF BREAST, UNSPECIFIED LATERALITY: ICD-10-CM

## 2022-07-25 DIAGNOSIS — E66.09 OBESITY DUE TO EXCESS CALORIES WITHOUT SERIOUS COMORBIDITY, UNSPECIFIED CLASSIFICATION: ICD-10-CM

## 2022-07-25 DIAGNOSIS — L81.9 HYPERPIGMENTATION OF SKIN: ICD-10-CM

## 2022-07-25 DIAGNOSIS — Z13.21 ENCOUNTER FOR VITAMIN DEFICIENCY SCREENING: ICD-10-CM

## 2022-07-25 PROCEDURE — G8417 CALC BMI ABV UP PARAM F/U: HCPCS | Performed by: NURSE PRACTITIONER

## 2022-07-25 PROCEDURE — G8427 DOCREV CUR MEDS BY ELIG CLIN: HCPCS | Performed by: NURSE PRACTITIONER

## 2022-07-25 PROCEDURE — 99214 OFFICE O/P EST MOD 30 MIN: CPT | Performed by: NURSE PRACTITIONER

## 2022-07-25 PROCEDURE — 4004F PT TOBACCO SCREEN RCVD TLK: CPT | Performed by: NURSE PRACTITIONER

## 2022-07-25 RX ORDER — DIAPER,BRIEF,INFANT-TODD,DISP
EACH MISCELLANEOUS
Qty: 15 G | Refills: 0 | Status: SHIPPED | OUTPATIENT
Start: 2022-07-25

## 2022-07-25 RX ORDER — CHLORTHALIDONE 25 MG/1
25 TABLET ORAL DAILY
Qty: 90 TABLET | Refills: 1 | Status: SHIPPED | OUTPATIENT
Start: 2022-07-25

## 2022-07-25 ASSESSMENT — PATIENT HEALTH QUESTIONNAIRE - PHQ9
SUM OF ALL RESPONSES TO PHQ QUESTIONS 1-9: 0
SUM OF ALL RESPONSES TO PHQ QUESTIONS 1-9: 0
2. FEELING DOWN, DEPRESSED OR HOPELESS: 0
SUM OF ALL RESPONSES TO PHQ QUESTIONS 1-9: 0
SUM OF ALL RESPONSES TO PHQ QUESTIONS 1-9: 0

## 2022-07-25 ASSESSMENT — ENCOUNTER SYMPTOMS
ABDOMINAL PAIN: 0
CHEST TIGHTNESS: 0
DIARRHEA: 0
CONSTIPATION: 0

## 2022-07-25 NOTE — PATIENT INSTRUCTIONS
aspirin every day. Aspirin can help certain people lower their risk of a heart attack or stroke. But taking aspirin isn't right for everyone, because it can cause serious bleeding. See your doctor regularly. You may need to see the doctor more often at first or until your blood pressure comes down. If you are taking blood pressure medicine, talk to your doctor before you take decongestants or anti-inflammatory medicine, such as ibuprofen. Some of these medicines can raise blood pressure. Learn how to check your blood pressure at home. Lifestyle changes  Stay at a healthy weight. This is especially important if you put on weight around the waist. Losing even 10 pounds can help you lower your blood pressure. If your doctor recommends it, get more exercise. Walking is a good choice. Bit by bit, increase the amount you walk every day. Try for at least 30 minutes on most days of the week. You also may want to swim, bike, or do other activities. Avoid or limit alcohol. Talk to your doctor about whether you can drink any alcohol. Try to limit how much sodium you eat to less than 2,300 milligrams (mg) a day. Your doctor may ask you to try to eat less than 1,500 mg a day. Eat plenty of fruits (such as bananas and oranges), vegetables, legumes, whole grains, and low-fat dairy products. Lower the amount of saturated fat in your diet. Saturated fat is found in animal products such as milk, cheese, and meat. Limiting these foods may help you lose weight and also lower your risk for heart disease. Do not smoke. Smoking increases your risk for heart attack and stroke. If you need help quitting, talk to your doctor about stop-smoking programs and medicines. These can increase your chances of quitting for good. When should you call for help? Call  911 anytime you think you may need emergency care. This may mean having symptoms that suggest that your blood pressure is causing a serious heart or blood vessel problem.  Your blood pressure may be over 180/120. For example, call 911 if:    You have symptoms of a heart attack. These may include:  Chest pain or pressure, or a strange feeling in the chest.  Sweating. Shortness of breath. Nausea or vomiting. Pain, pressure, or a strange feeling in the back, neck, jaw, or upper belly or in one or both shoulders or arms. Lightheadedness or sudden weakness. A fast or irregular heartbeat. You have symptoms of a stroke. These may include:  Sudden numbness, tingling, weakness, or loss of movement in your face, arm, or leg, especially on only one side of your body. Sudden vision changes. Sudden trouble speaking. Sudden confusion or trouble understanding simple statements. Sudden problems with walking or balance. A sudden, severe headache that is different from past headaches. You have severe back or belly pain. Do not wait until your blood pressure comes down on its own. Get help right away. Call your doctor now or seek immediate care if:    Your blood pressure is much higher than normal (such as 180/120 or higher), but you don't have symptoms. You think high blood pressure is causing symptoms, such as:  Severe headache. Blurry vision. Watch closely for changes in your health, and be sure to contact your doctor if:    Your blood pressure measures higher than your doctor recommends at least 2 times. That means the top number is higher or the bottom number is higher, or both. You think you may be having side effects from your blood pressure medicine. Where can you learn more? Go to https://Sun Numbermina.Rom[employer]. org and sign in to your [employer] account. Enter H624 in the [employer] box to learn more about \"High Blood Pressure: Care Instructions. \"     If you do not have an account, please click on the \"Sign Up Now\" link. Current as of: August 31, 2020               Content Version: 12.8  © 5573-9936 Healthwise, [employer].    Care instructions adapted under license by [facility] ([facility]). If you have questions about a medical condition or this instruction, always ask your healthcare professional. [address] any warranty or liability for your use of this information.

## 2022-07-26 PROBLEM — N60.09 CYST OF BREAST: Status: ACTIVE | Noted: 2022-07-26

## 2022-07-26 PROBLEM — L81.9 HYPERPIGMENTATION OF SKIN: Status: ACTIVE | Noted: 2022-07-26

## 2022-07-28 DIAGNOSIS — Z13.29 SCREENING FOR THYROID DISORDER: ICD-10-CM

## 2022-07-28 DIAGNOSIS — Z13.1 SCREENING FOR DIABETES MELLITUS: ICD-10-CM

## 2022-07-28 DIAGNOSIS — Z13.220 SCREENING FOR LIPID DISORDERS: ICD-10-CM

## 2022-07-28 DIAGNOSIS — Z11.59 NEED FOR HEPATITIS C SCREENING TEST: ICD-10-CM

## 2022-07-28 DIAGNOSIS — Z13.0 SCREENING FOR DEFICIENCY ANEMIA: ICD-10-CM

## 2022-07-28 DIAGNOSIS — Z13.21 ENCOUNTER FOR VITAMIN DEFICIENCY SCREENING: ICD-10-CM

## 2022-07-28 LAB
A/G RATIO: 1.5 (ref 1.1–2.2)
ALBUMIN SERPL-MCNC: 4.5 G/DL (ref 3.4–5)
ALP BLD-CCNC: 91 U/L (ref 40–129)
ALT SERPL-CCNC: 13 U/L (ref 10–40)
ANION GAP SERPL CALCULATED.3IONS-SCNC: 13 MMOL/L (ref 3–16)
AST SERPL-CCNC: 16 U/L (ref 15–37)
BASOPHILS ABSOLUTE: 0.1 K/UL (ref 0–0.2)
BASOPHILS RELATIVE PERCENT: 1.2 %
BILIRUB SERPL-MCNC: 0.4 MG/DL (ref 0–1)
BUN BLDV-MCNC: 9 MG/DL (ref 7–20)
CALCIUM SERPL-MCNC: 9.5 MG/DL (ref 8.3–10.6)
CHLORIDE BLD-SCNC: 99 MMOL/L (ref 99–110)
CHOLESTEROL, TOTAL: 224 MG/DL (ref 0–199)
CO2: 25 MMOL/L (ref 21–32)
CREAT SERPL-MCNC: 0.9 MG/DL (ref 0.6–1.1)
EOSINOPHILS ABSOLUTE: 0.1 K/UL (ref 0–0.6)
EOSINOPHILS RELATIVE PERCENT: 1.5 %
GFR AFRICAN AMERICAN: >60
GFR NON-AFRICAN AMERICAN: >60
GLUCOSE BLD-MCNC: 114 MG/DL (ref 70–99)
HCT VFR BLD CALC: 48.5 % (ref 36–48)
HDLC SERPL-MCNC: 53 MG/DL (ref 40–60)
HEMOGLOBIN: 16.5 G/DL (ref 12–16)
LDL CHOLESTEROL CALCULATED: 144 MG/DL
LYMPHOCYTES ABSOLUTE: 2.2 K/UL (ref 1–5.1)
LYMPHOCYTES RELATIVE PERCENT: 44.3 %
MCH RBC QN AUTO: 32.3 PG (ref 26–34)
MCHC RBC AUTO-ENTMCNC: 34.1 G/DL (ref 31–36)
MCV RBC AUTO: 94.8 FL (ref 80–100)
MONOCYTES ABSOLUTE: 0.6 K/UL (ref 0–1.3)
MONOCYTES RELATIVE PERCENT: 11.5 %
NEUTROPHILS ABSOLUTE: 2 K/UL (ref 1.7–7.7)
NEUTROPHILS RELATIVE PERCENT: 41.5 %
PDW BLD-RTO: 14.2 % (ref 12.4–15.4)
PLATELET # BLD: 375 K/UL (ref 135–450)
PMV BLD AUTO: 8.4 FL (ref 5–10.5)
POTASSIUM SERPL-SCNC: 4.4 MMOL/L (ref 3.5–5.1)
RBC # BLD: 5.11 M/UL (ref 4–5.2)
SODIUM BLD-SCNC: 137 MMOL/L (ref 136–145)
T4 FREE: 1.2 NG/DL (ref 0.9–1.8)
TOTAL PROTEIN: 7.6 G/DL (ref 6.4–8.2)
TRIGL SERPL-MCNC: 134 MG/DL (ref 0–150)
TSH REFLEX: 2.28 UIU/ML (ref 0.27–4.2)
VLDLC SERPL CALC-MCNC: 27 MG/DL
WBC # BLD: 4.9 K/UL (ref 4–11)

## 2022-07-29 LAB
FOLATE: 8.82 NG/ML (ref 4.78–24.2)
HEPATITIS C ANTIBODY INTERPRETATION: NORMAL
VITAMIN B-12: 379 PG/ML (ref 211–911)
VITAMIN D 25-HYDROXY: 9.6 NG/ML

## 2022-08-03 DIAGNOSIS — E55.9 VITAMIN D DEFICIENCY: Primary | ICD-10-CM

## 2022-08-03 RX ORDER — ERGOCALCIFEROL 1.25 MG/1
50000 CAPSULE ORAL WEEKLY
Qty: 12 CAPSULE | Refills: 0 | Status: SHIPPED | OUTPATIENT
Start: 2022-08-03 | End: 2022-10-24

## 2022-08-23 ENCOUNTER — HOSPITAL ENCOUNTER (OUTPATIENT)
Dept: CT IMAGING | Age: 42
Discharge: HOME | End: 2022-08-23
Payer: COMMERCIAL

## 2022-08-23 DIAGNOSIS — J34.89 NASAL SEPTAL PERFORATION: ICD-10-CM

## 2022-08-23 DIAGNOSIS — J32.4 CHRONIC PANSINUSITIS: ICD-10-CM

## 2022-08-23 PROCEDURE — 70486 CT MAXILLOFACIAL W/O DYE: CPT

## 2022-08-29 ENCOUNTER — TELEPHONE (OUTPATIENT)
Dept: ENT CLINIC | Age: 42
End: 2022-08-29

## 2022-08-29 NOTE — TELEPHONE ENCOUNTER
I called the patient to discuss her CT scan. I told her that overall her sinuses look okay. She has a cystic structure in the left maxillary sinus that appears to have originated from a posterior tooth. She said that she had a tooth pulled about a month ago and is still having some discomfort. I told her to follow back up with her dentist first.  The may want to send her to an oral surgeon. I do think that we could take the cyst out through the maxillary sinus endoscopically as well, but I would like for her to get the opinion of the dentist prior to consideration of this. She is going to to let me know what they say.

## 2022-09-06 NOTE — TELEPHONE ENCOUNTER
Pt calling back, she would like to have Dr Bari Cherry remove this cyst. Please let her know when this can be scheduled.

## 2022-09-07 LAB
ALBUMIN SERPL-MCNC: 4.4 G/DL (ref 3.4–5)
ALP BLD-CCNC: 93 U/L (ref 40–129)
ALT SERPL-CCNC: 10 U/L (ref 10–40)
AST SERPL-CCNC: 13 U/L (ref 15–37)
BILIRUB SERPL-MCNC: <0.2 MG/DL (ref 0–1)
BILIRUBIN DIRECT: <0.2 MG/DL (ref 0–0.3)
BILIRUBIN, INDIRECT: ABNORMAL MG/DL (ref 0–1)
TOTAL PROTEIN: 7.3 G/DL (ref 6.4–8.2)

## 2022-09-09 ENCOUNTER — OFFICE VISIT (OUTPATIENT)
Dept: BREAST CENTER | Age: 42
End: 2022-09-09
Payer: COMMERCIAL

## 2022-09-09 VITALS
BODY MASS INDEX: 33.9 KG/M2 | HEIGHT: 70 IN | RESPIRATION RATE: 18 BRPM | WEIGHT: 236.8 LBS | SYSTOLIC BLOOD PRESSURE: 124 MMHG | DIASTOLIC BLOOD PRESSURE: 78 MMHG | OXYGEN SATURATION: 100 % | HEART RATE: 70 BPM

## 2022-09-09 DIAGNOSIS — L98.8 SKIN LESION OF BREAST: Primary | ICD-10-CM

## 2022-09-09 DIAGNOSIS — Z12.39 ENCOUNTER FOR SCREENING BREAST EXAMINATION: ICD-10-CM

## 2022-09-09 PROCEDURE — G8417 CALC BMI ABV UP PARAM F/U: HCPCS | Performed by: NURSE PRACTITIONER

## 2022-09-09 PROCEDURE — 4004F PT TOBACCO SCREEN RCVD TLK: CPT | Performed by: NURSE PRACTITIONER

## 2022-09-09 PROCEDURE — G8427 DOCREV CUR MEDS BY ELIG CLIN: HCPCS | Performed by: NURSE PRACTITIONER

## 2022-09-09 PROCEDURE — 99203 OFFICE O/P NEW LOW 30 MIN: CPT | Performed by: NURSE PRACTITIONER

## 2022-09-09 RX ORDER — TRAMADOL HYDROCHLORIDE 50 MG/1
50 TABLET ORAL EVERY 6 HOURS PRN
COMMUNITY
End: 2022-10-25

## 2022-09-09 RX ORDER — PHENTERMINE HYDROCHLORIDE 37.5 MG/1
37.5 TABLET ORAL
COMMUNITY

## 2022-09-09 RX ORDER — ASCORBIC ACID 500 MG
500 TABLET ORAL DAILY
COMMUNITY

## 2022-09-09 RX ORDER — NICOTINE POLACRILEX 2 MG
GUM BUCCAL
COMMUNITY

## 2022-09-09 NOTE — PATIENT INSTRUCTIONS
Healthy Lifestyle Recommendations: healthy diet (decrease consumption of red meat, increase fresh fruits and vegetables), decreased alcohol consumption (less than 4 drinks/week), adequate sleep (goal 6-8 hours), routine exercise (goal 150 minutes/week or greater), weight control. Patient Education        Breast Self-Exam: Care Instructions  Your Care Instructions     A breast self-exam is when you check your breasts for lumps or changes. This regular exam helps you learn how your breasts normally look and feel. Mostbreast problems or changes are not because of cancer. Breast self-exam is not a substitute for a mammogram. Having regular breast exams by your doctor and regular mammograms improve your chances of finding anyproblems with your breasts. Some women set a time each month to do a step-by-step breast self-exam. Other women like a less formal system. They might look at their breasts as they brushtheir teeth, or feel their breasts once in a while in the shower. If you notice a change in your breast, tell your doctor. Follow-up care is a key part of your treatment and safety. Be sure to make and go to all appointments, and call your doctor if you are having problems. It's also a good idea to know your test results and keep alist of the medicines you take. How do you do a breast self-exam?  The best time to examine your breasts is usually one week after your menstrual period begins. Your breasts should not be tender then. If you do not have periods, you might do your exam on a day of the month that is easy to remember. To examine your breasts:  Remove all your clothes above the waist and lie down. When you are lying down, your breast tissue spreads evenly over your chest wall, which makes it easier to feel all your breast tissue. Use the pads--not the fingertips--of the 3 middle fingers of your left hand to check your right breast. Move your fingers slowly in small coin-sized circles that overlap.   Use three levels of pressure to feel of all your breast tissue. Use light pressure to feel the tissue close to the skin surface. Use medium pressure to feel a little deeper. Use firm pressure to feel your tissue close to your breastbone and ribs. Use each pressure level to feel your breast tissue before moving on to the next spot. Check your entire breast, moving up and down as if following a strip from the collarbone to the bra line, and from the armpit to the ribs. Repeat until you have covered the entire breast.  Repeat this procedure for your left breast, using the pads of the 3 middle fingers of your right hand. To examine your breasts while in the shower:  Place one arm over your head and lightly soap your breast on that side. Using the pads of your fingers, gently move your hand over your breast (in the strip pattern described above), feeling carefully for any lumps or changes. Repeat for the other breast.  Have your doctor inspect anything you notice to see if you need further testing. Where can you learn more? Go to https://Divshot.Sooligan. org and sign in to your [employer] account. Enter P148 in the [facility] box to learn more about \"Breast Self-Exam: Care Instructions. \"     If you do not have an account, please click on the \"Sign Up Now\" link. Current as of: September 8, 2021               Content Version: 13.3  © 9715-0892 Healthwise, Incorporated. Care instructions adapted under license by [facility] ([facility]). If you have questions about a medical condition or this instruction, always ask your healthcare professional. [place] [address] any warranty or liability for your use of this information.

## 2022-09-09 NOTE — PROGRESS NOTES
[facility]  Surgical Breast Oncology     Primary Care Provider:  VICKI Nazario CNP      CC: breast skin lesions    Sonja Nagy is being seen at the request of VICKI Nazario CNP for a consultation for breast skin lesions       HPI: Sonja Nagy is a 39 y.o. woman who presents today for further evaluation of skin lesions on bilateral breasts that are round, firm, sometime tender, sometimes drain purulent blood tinge fluid, occur intermittently; ongoing for years. She reports that she has similar skin lesions in her axilla, groin folds, and her back. Has never done anything for the lesions. Has an appt with derm in a few weeks to have the lesions further evaluated. She states that she does perform routine self breast evaluations and has not noticed any new abnormalities such as masses, skin changes, color changes,nipple discharge, or changes to the nipple-areolar complex. S    She had a normal mammogram 7/14/2022 that showed no concerning findings. BI-RADS1. She has never required a breast biopsy. History of bilateral breast reduction x2, last one ~2008/2009. She has no family history of breast or ovarian cancer. History reviewed. No pertinent past medical history. Past Surgical History:   Procedure Laterality Date    BREAST REDUCTION SURGERY      X2    KNEE ARTHROSCOPY Left     acl repair       Family History   Problem Relation Age of Onset    Elevated Lipids Mother     Hypertension Father     Cancer Father         lung    Diabetes Brother     Diabetes Maternal Grandfather     Heart Failure Paternal Grandmother        Allergies as of 09/09/2022    (No Known Allergies)       Social History     Tobacco Use    Smoking status: Every Day     Types: Cigarettes     Start date: 12/8/2005    Smokeless tobacco: Never    Tobacco comments:     2-3 cigarettes   Vaping Use    Vaping Use: Never used   Substance Use Topics    Alcohol use:  Yes     Alcohol/week: 2.0 standard drinks Types: 1 Glasses of wine, 1 Shots of liquor per week     Comment: 3 days/ week    Drug use: Never     Comment: cocaine and MJ in the past       Current Outpatient Medications on File Prior to Visit   Medication Sig Dispense Refill    traMADol (ULTRAM) 50 MG tablet Take 50 mg by mouth every 6 hours as needed for Pain. Biotin 1 MG CAPS Take by mouth      vitamin C (ASCORBIC ACID) 500 MG tablet Take 500 mg by mouth daily      vitamin D (ERGOCALCIFEROL) 1.25 MG (97250 UT) CAPS capsule Take 1 capsule by mouth once a week 12 capsule 0    chlorthalidone (HYGROTON) 25 MG tablet Take 1 tablet by mouth in the morning. 90 tablet 1    hydrocortisone 0.5 % cream Apply topically 2 times daily. 15 g 0    fluticasone (FLONASE) 50 MCG/ACT nasal spray 1 spray by Each Nostril route daily 32 g 1    valsartan (DIOVAN) 40 MG tablet Take 1 tablet by mouth daily 90 tablet 1    terbinafine (LAMISIL) 250 MG tablet Take 250 mg by mouth daily      ibuprofen (ADVIL;MOTRIN) 800 MG tablet Take 1 tablet by mouth 3 times daily (with meals) 270 tablet 1    phentermine (ADIPEX-P) 37.5 MG tablet Take 37.5 mg by mouth every morning (before breakfast). No current facility-administered medications on file prior to visit. Medication documentation has been reviewed in the electronic medical record and patient office intake form. Family History:  Paternal aunt, breast cancer, dx 46s  No additional family history of breast or ovarian cancer. Hormonal History:  G 6  P1 a.0  m.0  Menarche at age 15. First pregnancy at age 21. did breastfeed for 7 weeks.   premenopausal  Hysterectomy:  N/A  Denies estrogen supplementation   OCP use for 3 years, not currently using       REVIEW OF SYSTEMS:  Constitutional: Negative for fever  HENT: Negative for sore throat  Eyes: Negative for redness   Respiratory: Negative for dyspnea, cough  Cardiovascular: Negative for chest pain  Gastrointestinal: Negative for vomiting, diarrhea   Genitourinary: Negative for hematuria   Musculoskeletal: Negative for arthralgias   Skin: Negative for rash  Neurological: Negative for syncope  Hematological: Negative for adenopathy  Psychiatric/Behavorial: Negative for anxiety      PHYSICAL EXAM:  /78   Pulse 70   Resp 18   Ht 5' 10\" (1.778 m)   Wt 236 lb 12.8 oz (107.4 kg)   LMP  (LMP Unknown)   SpO2 100%   BMI 33.98 kg/m²   Constitutional: She appearswell-nourished. No apparent distress. Breast: The patient was examined in the upright and supine position. She has a \"DD\" cup breast. Breasts are symmetrically ptotic. Well healed scars and scabs on bilateral breast.         Right: No masses or changes in breast contour. No skin changes of the breast or nipple areolar complex. No nipple inversion or discharge. No erythema, thickening (peau d'orange), or dimpling. Left: No masses or changes in breast contour. No skin changes of the breast or nipple areolar complex. No nipple inversion or discharge. No erythema, thickening (peau d'orange), or dimpling. There is no axillary lymphadenopathy palpated bilaterally. Head: Normocephalic and atraumatic:   Eyes: EOM are normal. Pupils are equal, round, and reactive to light. Neck: Neck supple. No tracheal deviation present. No obvious mass. Lymphatics: No palpable supraclavicular, cervical, or axillary lymphadenopathy  Skin: No rash noted. No erythema. Neurologic: alert and oriented. Extremities: appear well perfused. Risk assessment using JACINTA Breast Cancer Risk Evaluation Tool to evaluate her risk compared to the general population. Her lifetime risk for breast cancer is 12.1% (general population average 12.8%). ASSESSMENT:   Bilateral breast skin lesions - no concerning findings on recent breast imaging. No concerning findings on clinical breast exam.  Skin lesions are of of dermatologic origin - folliculitis vs epidermoid cyst vs boils, vs other ?   Screening Breast Examination - benign breast examination       PLAN:   Recommend further evaluation with dermatology for skin lesions   Continue annual screening mammography with tomosynthesis, next screening mammogram due: 7/2023  Continue annual clinical breast exams   Discussed the importance of breast awareness including the importance and technique of self breast exams  Most recent imaging was reviewed and the results were discussed with the patient, all questions answered  Education provided for Healthy Lifestyle Recommendations: healthy diet, routine exercise, weight control, decreased alcohol consumption. Follow-up ARLET Koch, APRN-CNP  [address] [place] [address]   Surgical Breast Oncology   [phone]    All of the patient's questions were answered at this time however, she was encouraged to call the office with any further inquiries. Approximately  35minutes of time were spent in preparation, direct patient contact, counseling, care coordination, documentation and activities otherwise related to this encounter.

## 2022-09-15 ENCOUNTER — OFFICE VISIT (OUTPATIENT)
Dept: ORTHOPEDIC SURGERY | Age: 42
End: 2022-09-15
Payer: COMMERCIAL

## 2022-09-15 DIAGNOSIS — S93.402S MODERATE LEFT ANKLE SPRAIN, SEQUELA: Primary | ICD-10-CM

## 2022-09-15 PROCEDURE — G8427 DOCREV CUR MEDS BY ELIG CLIN: HCPCS | Performed by: ORTHOPAEDIC SURGERY

## 2022-09-15 PROCEDURE — 4004F PT TOBACCO SCREEN RCVD TLK: CPT | Performed by: ORTHOPAEDIC SURGERY

## 2022-09-15 PROCEDURE — 99212 OFFICE O/P EST SF 10 MIN: CPT | Performed by: ORTHOPAEDIC SURGERY

## 2022-09-15 PROCEDURE — G8417 CALC BMI ABV UP PARAM F/U: HCPCS | Performed by: ORTHOPAEDIC SURGERY

## 2022-09-15 NOTE — PROGRESS NOTES
ORTHOPAEDIC NEW PATIENT NOTE    Chief Complaint   Patient presents with    Follow-up     Left ankle        HPI  9/15/22  Patient returns to clinic today for follow-up of her left ankle  She would like to start ramping up her activities, working out, including running on the treadmill, and therefore returns to clinic today to make sure she is okay to advance  She is still wearing her ankle lacer brace intermittently  She still describes some minimal swelling  She reports the ankle is much improved overall, occasional aches with too much activity      6/23/22  39 y.o. female seen for evaluation of left ankle injury/pain:  Onset 6/19 (4 days ago)  Injury/trauma - down in [place] for her sister's wedding, stepping down from the curb, and twisted her left ankle  History of symptoms denies  Pain is located left lateral ankle, dorsal foot  Worse with pressure, WB  Better with rest  Associated with swelling  Numbness and/or tingling = no        No Known Allergies     Current Outpatient Medications   Medication Sig Dispense Refill    traMADol (ULTRAM) 50 MG tablet Take 50 mg by mouth every 6 hours as needed for Pain.      phentermine (ADIPEX-P) 37.5 MG tablet Take 37.5 mg by mouth every morning (before breakfast). Biotin 1 MG CAPS Take by mouth      vitamin C (ASCORBIC ACID) 500 MG tablet Take 500 mg by mouth daily      vitamin D (ERGOCALCIFEROL) 1.25 MG (04339 UT) CAPS capsule Take 1 capsule by mouth once a week 12 capsule 0    chlorthalidone (HYGROTON) 25 MG tablet Take 1 tablet by mouth in the morning. 90 tablet 1    hydrocortisone 0.5 % cream Apply topically 2 times daily.  15 g 0    fluticasone (FLONASE) 50 MCG/ACT nasal spray 1 spray by Each Nostril route daily 32 g 1    valsartan (DIOVAN) 40 MG tablet Take 1 tablet by mouth daily 90 tablet 1    terbinafine (LAMISIL) 250 MG tablet Take 250 mg by mouth daily      ibuprofen (ADVIL;MOTRIN) 800 MG tablet Take 1 tablet by mouth 3 times daily (with meals) 270 tablet 1 Body mass index is 33.86 kg/m² (pended). Psychiatric - pleasant, normal mood & affect  Cardiovascular - RRR, negative peripheral edema, dorsalis pedis pulse 2+  Neurological - LLE SILT SP/DP/T/sural/saphenous nerve distributions; EHL/FHL/TA/GS intact  Left foot/ankle - no ecchymosis, no deformity   Trace swelling laterally   No TTP ATFL, CFL, PTFL   No TTP elsewhere   No plantar ecchymosis   Preserved arch   Anterior drawer stable with firm endpoint in both ankle dorsiflexion and plantarflexion   Preserved AROM DF/PF/inversion/eversion      Imaging:  Prior images reviewed  Left ankle 3 views performed 6/23/22 in clinic - possible small avulsion/chip fracture of the lateral process of the talus. No other fractures identified. Mortise is intact. Lateral soft tissue swelling is seen. Left foot 3 views performed 6/23/22 in clinic - no fractures identified. Midfoot alignment is maintained. No dislocation. Assessment & Plan:  39 y.o. female who presents with    Diagnosis Orders   1. Moderate left ankle sprain, [facility]          No orders of the defined types were placed in this encounter.       Reassurance given  Advance activities as tolerated  Good comfortable supportive shoes recommended   Recommend discontinue ankle lacer brace  Ice, Tylenol/NSAIDs as needed  Discussed physical therapy for strengthening/proprioception exercises as she ramps up activities, she is interested, referral given  Follow-up with any issues    Melodie Lockhart MD

## 2022-09-30 ENCOUNTER — OFFICE VISIT (OUTPATIENT)
Dept: ENT CLINIC | Age: 42
End: 2022-09-30
Payer: COMMERCIAL

## 2022-09-30 VITALS
HEIGHT: 70 IN | BODY MASS INDEX: 33.07 KG/M2 | WEIGHT: 231 LBS | DIASTOLIC BLOOD PRESSURE: 81 MMHG | SYSTOLIC BLOOD PRESSURE: 117 MMHG

## 2022-09-30 DIAGNOSIS — M27.2 ODONTOGENIC INFECTION OF JAW: ICD-10-CM

## 2022-09-30 DIAGNOSIS — J30.9 ALLERGIC RHINITIS, UNSPECIFIED SEASONALITY, UNSPECIFIED TRIGGER: ICD-10-CM

## 2022-09-30 DIAGNOSIS — J34.89 LESION OR MASS OF PARANASAL SINUSES: Primary | ICD-10-CM

## 2022-09-30 PROCEDURE — G8427 DOCREV CUR MEDS BY ELIG CLIN: HCPCS | Performed by: OTOLARYNGOLOGY

## 2022-09-30 PROCEDURE — 4004F PT TOBACCO SCREEN RCVD TLK: CPT | Performed by: OTOLARYNGOLOGY

## 2022-09-30 PROCEDURE — G8417 CALC BMI ABV UP PARAM F/U: HCPCS | Performed by: OTOLARYNGOLOGY

## 2022-09-30 PROCEDURE — 99213 OFFICE O/P EST LOW 20 MIN: CPT | Performed by: OTOLARYNGOLOGY

## 2022-09-30 RX ORDER — FLUTICASONE PROPIONATE 50 MCG
1 SPRAY, SUSPENSION (ML) NASAL DAILY
Qty: 32 G | Refills: 1 | Status: SHIPPED | OUTPATIENT
Start: 2022-09-30

## 2022-09-30 NOTE — PROGRESS NOTES
[address] [facility]  Follow up      Patient Name: Jules Richardson Record Number:  [id number]  Primary Care Physician:  Jessica Paris, APRN - CNP  Date of Consultation: 9/30/2022    Chief Complaint: Left maxillary sinus lesion      Interval History    Patient following up for sinus lesion. I have been evaluating her for sinusitis and a CT scan showed a periapical lesion above a previously extracted maxillary molar that was extending into her left maxillary sinus. I recommended she see the oral surgeon that pulled the tooth, but she has not done so. She does have pretty bad allergies. Is using Flonase. Is interested in seeing an allergist.        REVIEW OF SYSTEMS  As above    PHYSICAL EXAM  GENERAL: No Acute Distress, Alert and Oriented, no Hoarseness, strong voice  EYES: EOMI, Anti-icteric  HENT:   Head: Normocephalic and atraumatic. Face:  Symmetric, facial nerve intact, no sinus tenderness  Right Ear: Normal external ear, normal external auditory canal, intact tympanic membrane with normal mobility and aerated middle ear  Left Ear: Normal external ear, normal external auditory canal, intact tympanic membrane with normal mobility and aerated middle ear  Mouth/Oral Cavity:  normal lips, Uvula is midline, no mucosal lesions, no trismus  Oropharynx/Larynx:  normal oropharynx  Nose:Normal external nasal appearance. Anterior rhinoscopy shows some nasal crusting  NECK: Normal range of motion, no thyromegaly, trachea is midline, no lymphadenopathy, no neck masses, no crepitus          ASSESSMENT/PLAN  1. Lesion or mass of paranasal sinuses  I discussed the lesion again with the patient. I explained to her that it is extending into the left maxillary sinus and is likely odontogenic in nature. I told her that from appears sinus standpoint it would not be causing her any issues. In fact I do not think that it is currently causing problems.   This could be a periapical lesion that will not expand or get worse now that the tooth is gone. It could also be a different type of odontogenic cyst that will continue to enlarge. I explained her this is the reason why I would like for her to see the oral surgeon to see what their thoughts are. It would certainly be easy enough to do a maximal antrostomy and essentially marsupialize it. If her oral surgeon feels as though this is necessary and curative then I am happy to do it, but I want her to see them first.  2. Odontogenic infection of jaw  As above    3. Allergic rhinitis, unspecified seasonality, unspecified trigger  I referred her to allergy.  - External Referral To Allergy             I have performed a head and neck physical exam personally or was physically present during the key or critical portions of the service. This note was generated completely or in part utilizing Dragon dictation speech recognition software. Occasionally, words are mistranscribed and despite editing, the text may contain inaccuracies due to incorrect word recognition. If further clarification is needed please contact the office at [phone].

## 2022-10-07 ENCOUNTER — HOSPITAL ENCOUNTER (OUTPATIENT)
Dept: PHYSICAL THERAPY | Age: 42
Setting detail: THERAPIES SERIES
Discharge: HOME | End: 2022-10-07
Payer: COMMERCIAL

## 2022-10-07 PROCEDURE — 97110 THERAPEUTIC EXERCISES: CPT

## 2022-10-07 PROCEDURE — 97161 PT EVAL LOW COMPLEX 20 MIN: CPT

## 2022-10-07 PROCEDURE — 97140 MANUAL THERAPY 1/> REGIONS: CPT

## 2022-10-07 NOTE — PLAN OF CARE
function and get back to running and HIIT workouts. She had been running in May, doing intervals for about 5 minutes at a time. Then had ankle sprain in June, and also around that time was seeing me for PT for her back/ sciatica. Hx: L ACLR (w/ residual deficits)  Relevant Medical History:   Functional Outcome Measure: FOTO  = 76    Pain Scale: 1/10 at rest, 5/10 after walking on treadmill  Easing factors: rest  Provocative factors: stairs, running, walking, squatting     Type: []Constant   []Intermittent  []Radiating [x]Localized []other:     Numbness/Tingling: denies    Occupation/School:works from home    Living Status/Prior Level of Function: Independent with ADLs and IADLs,     OBJECTIVE:     Posture: wnl    Functional Mobility/Transfers:     Palpation: tender sinus tarsi, not tender around peroneals    Bandages/Dressings/Incisions:     Gait: (include devices/WB status) mild deficit w/ ankle rocker w/ early heel rise on L    ROM LEFT RIGHT comments   HIP Flex      HIP Abd      HIP Ext      HIP IR      HIP ER      Knee ext      Knee Flex      Ankle PF      Ankle DF CKC @ start 35*  After manip:  40* @ start 40*    Ankle In      Ankle Ev      Strength  LEFT RIGHT    HIP Flexors      HIP Abductors      HIP Ext      Hip ER      Knee EXT (quad)      Knee Flex (HS)      Ankle DF   Able to heel walk/ toe walk with min discomfort on L   Ankle PF      Ankle Inv 5/5 5/5    Ankle EV 4/5 5/5          Circumference  Mid apex  7 cm prox              Reflexes/Sensation:    [x]Dermatomes/Myotomes intact    [x]Reflexes equal and normal bilaterally   []Other:    Joint mobility:    []Normal    [x]Hypo   []Hyper    Orthopedic Special Tests:   Weakly pos talar tilt w/ lateral pain  SLS: >15sec ea w/ eyes open: mild wavering on L  Functional squat: pos for L ankle mobility deficits and pain                       [x] Patient history, allergies, meds reviewed. Medical chart reviewed. See intake form.      Review Of Systems (ROS):  [x]Performed Review of systems (Integumentary, CardioPulmonary, Neurological) by intake and observation. Intake form has been scanned into medical record. Patient has been instructed to contact their primary care physician regarding ROS issues if not already being addressed at this time. Co-morbidities/Complexities (which will affect course of rehabilitation):  has no past medical history on file. []None           Arthritic conditions   []Rheumatoid arthritis (M05.9)  []Osteoarthritis (M19.91)   Cardiovascular conditions   []Hypertension (I10)  []Hyperlipidemia (E78.5)  []Angina pectoris (I20)  []Atherosclerosis (I70)  []CVA Musculoskeletal conditions   []Disc pathology   []Congenital spine pathologies   []Prior surgical intervention  []Osteoporosis (M81.8)  []Osteopenia (M85.8)   Endocrine conditions   []Hypothyroid (E03.9)  []Hyperthyroid Gastrointestinal conditions   []Constipation (C29.54)   Metabolic conditions   []Morbid obesity (E66.01)  []Diabetes type 1(E10.65) or 2 (E11.65)   []Neuropathy (G60.9)     Pulmonary conditions   []Asthma (J45)  []Coughing   []COPD (J44.9)   Psychological Disorders  []Anxiety (F41.9)  []Depression (F32.9)   []Other:   [x]Other:     Hx L ACL reconstruction; \"never been 100%\"     Barriers to/and or personal factors that will affect rehab potential:              []Age  []Sex    []Smoker              []Motivation/Lack of Motivation                        [x]Co-Morbidities              []Cognitive Function, education/learning barriers              []Environmental, home barriers              []profession/work barriers  [x]past PT/medical experience  []other:  Justification:     Falls Risk Assessment (30 days):   [x] Falls Risk assessed and no intervention required.   [] Falls Risk assessed and Patient requires intervention due to being higher risk   TUG score (>12s at risk):     [] Falls education provided, including       ASSESSMENT:   Functional Impairments: [x]Noted lumbar/proximal hip/LE hypomobility   [x]Decreased LE functional ROM   [x]Decreased core/proximal hip strength and neuromuscular control   [x]Decreased LE functional strength   [x]Reduced balance/proprioceptive control   []other:      Functional Activity Limitations (from functional questionnaire and intake)   [x]Reduced ability to tolerate prolonged functional positions   []Reduced ability or difficulty with changes of positions or transfers between positions   []Reduced ability to maintain good posture and demonstrate good body mechanics with sitting, bending, and lifting   []Reduced ability to sleep   [] Reduced ability or tolerance with driving and/or computer work   [x]Reduced ability to perform lifting, carrying tasks   [x]Reduced ability to squat   []Reduced ability to forward bend   [x]Reduced ability to ambulate prolonged functional periods/distances/surfaces   [x]Reduced ability to ascend/descend stairs   [x]Reduced ability to run, hop or jump   []other:     Participation Restrictions   []Reduced participation in self care activities   []Reduced participation in home management activities   []Reduced participation in work activities   [x]Reduced participation in social activities. [x]Reduced participation in sport activities. Classification :    []Signs/symptoms consistent with post-surgical status including decreased ROM, strength and function.    [x]Signs/symptoms consistent with joint sprain/strain   []Signs/symptoms consistent with patella-femoral syndrome   []Signs/symptoms consistent with knee OA/hip OA   []Signs/symptoms consistent with internal derangement of knee/Hip   []Signs/symptoms consistent with functional hip weakness/NMR control      []Signs/symptoms consistent with tendinitis/tendinosis    []signs/symptoms consistent with pathology which may benefit from Dry needling      []other:      Prognosis/Rehab Potential:      [x]Excellent   []Good    []Fair   []Poor    Tolerance of evaluation/treatment:    [x]Excellent   []Good    []Fair   []Poor    Physical Therapy Evaluation Complexity Justification  [x] A history of present problem with:  [] no personal factors and/or comorbidities that impact the plan of care;  [x]1-2 personal factors and/or comorbidities that impact the plan of care  []3 personal factors and/or comorbidities that impact the plan of care  [x] An examination of body systems using standardized tests and measures addressing any of the following: body structures and functions (impairments), activity limitations, and/or participation restrictions;:  [x] a total of 1-2 or more elements   [] a total of 3 or more elements   [] a total of 4 or more elements   [x] A clinical presentation with:  [x] stable and/or uncomplicated characteristics   [] evolving clinical presentation with changing characteristics  [] unstable and unpredictable characteristics;   [x] Clinical decision making of [] low, [] moderate, [] high complexity using standardized patient assessment instrument and/or measurable assessment of functional outcome. [x] EVAL (LOW) 80959 (typically 20 minutes face-to-face)  [] EVAL (MOD) 14429 (typically 30 minutes face-to-face)  [] EVAL (HIGH) 30802 (typically 45 minutes face-to-face)  [] RE-EVAL     PLAN:  Frequency/Duration:  up to 2 days per week for up to 4-10 Weeks:  Interventions:  [x]  Therapeutic exercise including: strength training, ROM, for Lower extremity and core   [x]  NMR activation and proprioception for LE, Glutes and Core   [x]  Manual therapy as indicated for LE, Hip and spine to include: Dry Needling/IASTM, STM, PROM, Gr I-IV mobilizations, manipulation. [x] Modalities as needed that may include: thermal agents, E-stim, Biofeedback, US, iontophoresis as indicated  [x] Patient education on joint protection, postural re-education, activity modification, progression of HEP.     HEP instruction: Access Code: Nader Meneses: [email or web address]  Date: 10/07/2022  Prepared by: Savanah Hammond    Exercises  Kneeling Ankle Dorsiflexion Self-Mobilization with Towel - 2-3 x daily - 4-6 x weekly - 2-3 sets - 10-15 reps  Single Leg Balance with Clock Reach - 2-3 x daily - 4-6 x weekly - 2-3 sets - 10-15 reps  Squat with Chair Touch - 2-3 x daily - 4-6 x weekly - 2-3 sets - 10-15 reps    GOALS:  Patient stated goal: able to return to interval jogging and HIIT workouts w/o pain  [] Progressing: [] Met: [] Not Met: [] Adjusted    Therapist goals for Patient:   Short Term Goals: To be achieved in: 2 weeks  1. Independent in HEP and progression per patient tolerance, in order to prevent re-injury. [] Progressing: [] Met: [] Not Met: [] Adjusted  2. Patient will have a decrease in pain to facilitate improvement in movement, function, and ADLs as indicated by Functional Deficits. [] Progressing: [] Met: [] Not Met: [] Adjusted    Long Term Goals: To be achieved in: up to 10 weeks  1. Increase FOTO functional outcome score from 76 to 83  to assist with reaching prior level of function. [] Progressing: [] Met: [] Not Met: [] Adjusted  2. Patient will demonstrate increased AROM to full/ symmetrical to allow for proper joint functioning as indicated by patients Functional Deficits. [] Progressing: [] Met: [] Not Met: [] Adjusted  3. Patient will demonstrate an increase in Strength to good proximal hip strength and control, within 5lb HHD in LE to allow for proper functional mobility as indicated by patients Functional Deficits. [] Progressing: [] Met: [] Not Met: [] Adjusted  4. Patient will return to all functional activities without increased symptoms or restriction. [] Progressing: [] Met: [] Not Met: [] Adjusted  5. Able to perform Y balance fwd reach w/ <4cm deficit. [] Progressing: [] Met: [] Not Met: [] Adjusted     Electronically signed by:   Tressa Morel, PT , DPT  #[id number]        Note: If patient does not return for scheduled/recommended follow up visits, this note will serve as a discharge from care along with the most recent update on progress.

## 2022-10-07 NOTE — FLOWSHEET NOTE
[facility] - Outpatient Rehabilitation & Therapy  [address] [place] [address]. [address]  Phone: [phone]   Fax:     [phone]      Date:  10/07/2022    Patient Name:  Raynald Galeazzi    :  1980  MRN: [id number]    Pertinent Medical History:      Medical/Treatment Diagnosis Information:  Medical Diagnosis: Moderate left ankle sprain, sequela [S93.402S]  Treatment Diagnosis: L ankle limited mobility, function, pain    Insurance/Certification information:     Physician Information:  Albina Oliva MD  Plan of care signed (Y/N):     Date of Patient follow up with Physician:      Progress Report: []  Yes  [x]  No     Date Range for reporting period:  Beginning: 10/7/2022  Ending:     Progress report due (10 Rx/or 30 days whichever is less):      Recertification due (POC duration/ or 90 days whichever is less):      Visit # Insurance/POC Allowable Auth Needed   1 / Jackye Cruz after 30 []Yes    []No       Latex Allergy:  [x]NO      []YES  Preferred Language for Healthcare:   [x]English       []Other:    Functional Scale:      Date assessed: at eval  Test: FOTO  Score:    Pain level:  /10     History of Injury: Pt here for evaluation of chronic L ankle pain related to an ankle sprain in . She has been back at the gym for about 2 weeks walking on treadmill a couple times a week and she has some mild soreness afterwards. She is here for evaluation to hopefully improve her ankle function and get back to running and HIIT workouts. She had been running in May, doing intervals for about 5 minutes at a time. Then had ankle sprain in , and also around that time was seeing me for PT for her back/ sciatica.      SUBJECTIVE:  See eval    OBJECTIVE:  Observation:   Test measurements:      RESTRICTIONS/PRECAUTIONS: (hx L ACL surgery- possible residual deficits)    Exercises/Interventions:     Therapeutic Ex (87505)   Min: Reps/Resistance Notes/CUES 1/2 kneel CKC DF self mob 2x10 Pre and pos manip        Calf raise     squat Chair: pt demo Future try on unstable surface        Steps >down fwd? Pt edu HEP review    Manual Intervention ([phone])  Min:                    Ankle mobs Post TC glide  TC dist: mid to high grade W/ cavitation             NMR re-education (42381)  Min:  CUES NEEDED   SLS clock reach Pt demo    Seated wobble board > all dir    Balance work >tandem, airex, ball toss, etc              Therapeutic Activity (58057)  Min:     Pt edu Role of PT in mgmt chronic ankle sprain, role of thrust mobilizations and proprioceptive/ strength work to restore stability/ function    Ankle taping >pos fibular taping? Modalities  Min:                           Other Therapeutic Activities: Pt was educated on PT POC, Diagnosis, Prognosis, pathomechanics as well as frequency and duration of scheduling future physical therapy appointments. Time was also taken on this day to answer all patient questions and participation in PT. Reviewed appointment policy in detail with patient and patient verbalized understanding. Home Exercise Program: Patient was instructed in the following for HEP:       10/7: Access Code: [facility]  URL: [email or web address]  Date: 10/07/2022  Prepared by: Henrique Manual     Exercises  Kneeling Ankle Dorsiflexion Self-Mobilization with Towel - 2-3 x daily - 4-6 x weekly - 2-3 sets - 10-15 reps  Single Leg Balance with Clock Reach - 2-3 x daily - 4-6 x weekly - 2-3 sets - 10-15 reps  Squat with Chair Touch - 2-3 x daily - 4-6 x weekly - 2-3 sets - 10-15 reps           . Patient verbalized/demonstrated understanding and was issued written handout.       Therapeutic Exercise and NMR EXR  [x] (53421) Provided verbal/tactile cueing for activities related to strengthening, flexibility, endurance, ROM for improvements in LE, proximal hip, and core control with self care, mobility, lifting, ambulation. [x] (77544) Provided verbal/tactile cueing for activities related to improving balance, coordination, kinesthetic sense, posture, motor skill, proprioception  to assist with LE, proximal hip, and core control in self care, mobility, lifting, ambulation and eccentric single leg control. NMR and Therapeutic Activities:    [x] (41741 or 41794) Provided verbal/tactile cueing for activities related to improving balance, coordination, kinesthetic sense, posture, motor skill, proprioception and motor activation to allow for proper function of core, proximal hip and LE with self care and ADLs and functional mobility. [x] (20045) Gait Re-education- Provided training and instruction to the patient for proper LE, core and proximal hip recruitment and positioning and eccentric body weight control with ambulation re-education including up and down stairs     Home Exercise Program:    [x] (78317) Reviewed/Progressed HEP activities related to strengthening, flexibility, endurance, ROM of core, proximal hip and LE for functional self-care, mobility, lifting and ambulation/stair navigation   [x] (44254)Reviewed/Progressed HEP activities related to improving balance, coordination, kinesthetic sense, posture, motor skill, proprioception of core, proximal hip and LE for self care, mobility, lifting, and ambulation/stair navigation      Manual Treatments:  PROM / STM / Oscillations-Mobs:  G-I, II, III, IV (PA's, Inf., Post.)  [x] (98595) Provided manual therapy to mobilize LE, proximal hip and/or LS spine soft tissue/joints for the purpose of modulating pain, promoting relaxation,  increasing ROM, reducing/eliminating soft tissue swelling/inflammation/restriction, improving soft tissue extensibility and allowing for proper ROM for normal function with self care, mobility, lifting and ambulation.        Approval Dates:  CPT Code Units Approved Units Used  Date Updated:                     Charges:  Timed Code Treatment Minutes: 26   Total Treatment Minutes: 39      [x] EVAL (LOW) 37101 (typically 20 minutes face-to-face)  [] EVAL (MOD) 31471 (typically 30 minutes face-to-face)  [] EVAL (HIGH) 02777 (typically 45 minutes face-to-face)  [] RE-EVAL     [x] WA(60783) x     [] Dry needle 1 or 2 Muscles (99548)  [] NMR (06349) x     [] Dry needle 3+ Muscles (70751)  [x] Manual (22106) x     [] Ultrasound (65507) x  [] TA (52876) x     [] Mech Traction (36464)  [] ES(attended) (14328)     [] ES (un) (02696):   [] Vasopump (45639) [] Ionto (76721)   [] Other:    Zaid Ayanna stated goal: able to return to interval jogging and HIIT workouts w/o pain  [] Progressing: [] Met: [] Not Met: [] Adjusted    Therapist goals for Patient:   Short Term Goals: To be achieved in: 2 weeks  1. Independent in HEP and progression per patient tolerance, in order to prevent re-injury. [] Progressing: [] Met: [] Not Met: [] Adjusted  2. Patient will have a decrease in pain to facilitate improvement in movement, function, and ADLs as indicated by Functional Deficits. [] Progressing: [] Met: [] Not Met: [] Adjusted    Long Term Goals: To be achieved in: up to 10 weeks  1. Increase FOTO functional outcome score from 76 to 83  to assist with reaching prior level of function. [] Progressing: [] Met: [] Not Met: [] Adjusted  2. Patient will demonstrate increased AROM to full/ symmetrical to allow for proper joint functioning as indicated by patients Functional Deficits. [] Progressing: [] Met: [] Not Met: [] Adjusted  3. Patient will demonstrate an increase in Strength to good proximal hip strength and control, within 5lb HHD in LE to allow for proper functional mobility as indicated by patients Functional Deficits. [] Progressing: [] Met: [] Not Met: [] Adjusted  4. Patient will return to all functional activities without increased symptoms or restriction. [] Progressing: [] Met: [] Not Met: [] Adjusted  5.  Able to perform Y balance fwd reach w/ <4cm deficit. [] Progressing: [] Met: [] Not Met: [] Adjusted     ASSESSMENT:  See eval    Treatment/Activity Tolerance:  [x] Patient tolerated treatment well [] Patient limited by fatique  [] Patient limited by pain  [] Patient limited by other medical complications  [] Other:     Overall Progression Towards Functional goals/ Treatment Progress Update:  [] Patient is progressing as expected towards functional goals listed. [] Progression is slowed due to complexities/Impairments listed. [] Progression has been slowed due to co-morbidities. [x] Plan just implemented, too soon to assess goals progression <30days   [] Goals require adjustment due to lack of progress  [] Patient is not progressing as expected and requires additional follow up with physician  [] Other    Prognosis for POC: [x] Good [] Fair  [] Poor    Patient requires continued skilled intervention: [x] Yes  [] No        PLAN:   [] Continue per plan of care [] Alter current plan (see comments)  [x] Plan of care initiated [] Hold pending MD visit [] Discharge    Electronically signed by: Ariel Mesa PT , DPT  #[id number]      Note: If patient does not return for scheduled/recommended follow up visits, this note will serve as a discharge from care along with the most recent update on progress.

## 2022-10-11 ENCOUNTER — HOSPITAL ENCOUNTER (OUTPATIENT)
Dept: PHYSICAL THERAPY | Age: 42
Setting detail: THERAPIES SERIES
Discharge: HOME | End: 2022-10-11
Payer: COMMERCIAL

## 2022-10-11 NOTE — FLOWSHEET NOTE
[address] [facility] [address]  Phone: [phone]   Fax:     [phone]    Physical Therapy  Cancellation/No-show Note  Patient Name:  Gayle Jackman  :  1980   Date:  10/11/2022  Cancelled visits to date: 1  No-shows to date: 0    Patient status for today's appointment patient:  [x]  Cancelled  []  Rescheduled appointment  []  No-show     Reason given by patient:  []  Patient ill  []  Conflicting appointment  []  No transportation    []  Conflict with work  []  No reason given  [x]  Other:     Comments:  pt doing markedly better, wants to wait a little longer between PT visits. Pt was reminded of attendance policy and encouraged to call a few days in advance if she does not want to keep her appmt. Phone call information:   []  Phone call made today to patient at _ time at number provided:      []  Patient answered, conversation as follows:    []  Patient did not answer, message left as follows:  []  Phone call not made today    Electronically signed by:   Patience Montiel, PT

## 2022-10-18 ENCOUNTER — APPOINTMENT (OUTPATIENT)
Dept: PHYSICAL THERAPY | Age: 42
End: 2022-10-18
Payer: COMMERCIAL

## 2022-10-20 DIAGNOSIS — E55.9 VITAMIN D DEFICIENCY: ICD-10-CM

## 2022-10-20 DIAGNOSIS — I10 ESSENTIAL HYPERTENSION: ICD-10-CM

## 2022-10-20 NOTE — TELEPHONE ENCOUNTER
Medication:   Requested Prescriptions     Pending Prescriptions Disp Refills    ibuprofen (ADVIL;MOTRIN) 800 MG tablet [Pharmacy Med Name: IBUPROFEN 800 MG TABLET] 90 tablet 5     Sig: TAKE 1 TABLET BY MOUTH 3 TIMES DAILY WITH MEALS    vitamin D (ERGOCALCIFEROL) 1.25 MG (54912 UT) CAPS capsule [Pharmacy Med Name: VITAMIN D2 1.25MG(50,000 UNIT)] 4 capsule 2     Sig: TAKE 1 CAPSULE BY MOUTH ONE TIME PER WEEK    valsartan (DIOVAN) 40 MG tablet [Pharmacy Med Name: VALSARTAN 40 MG TABLET] 90 tablet 1     Sig: TAKE 1 TABLET BY MOUTH EVERY DAY        Last Filled:      Patient Phone Number: [phone] (home)     Last appt: 7/25/2022   Next appt: 10/25/2022    Last OARRS: No flowsheet data found.

## 2022-10-21 ENCOUNTER — APPOINTMENT (OUTPATIENT)
Dept: PHYSICAL THERAPY | Age: 42
End: 2022-10-21
Payer: COMMERCIAL

## 2022-10-24 RX ORDER — ERGOCALCIFEROL 1.25 MG/1
CAPSULE ORAL
Qty: 4 CAPSULE | Refills: 2 | Status: SHIPPED | OUTPATIENT
Start: 2022-10-24

## 2022-10-24 RX ORDER — VALSARTAN 40 MG/1
TABLET ORAL
Qty: 90 TABLET | Refills: 1 | Status: SHIPPED | OUTPATIENT
Start: 2022-10-24

## 2022-10-24 RX ORDER — IBUPROFEN 800 MG/1
TABLET ORAL
Qty: 90 TABLET | Refills: 5 | Status: SHIPPED | OUTPATIENT
Start: 2022-10-24

## 2022-10-25 ENCOUNTER — OFFICE VISIT (OUTPATIENT)
Dept: PRIMARY CARE CLINIC | Age: 42
End: 2022-10-25
Payer: COMMERCIAL

## 2022-10-25 VITALS
WEIGHT: 237 LBS | SYSTOLIC BLOOD PRESSURE: 119 MMHG | HEART RATE: 95 BPM | DIASTOLIC BLOOD PRESSURE: 86 MMHG | BODY MASS INDEX: 33.93 KG/M2 | HEIGHT: 70 IN | OXYGEN SATURATION: 99 % | TEMPERATURE: 98.7 F

## 2022-10-25 DIAGNOSIS — L73.2 HIDRADENITIS SUPPURATIVA: ICD-10-CM

## 2022-10-25 DIAGNOSIS — L28.0 LICHEN SIMPLEX CHRONICUS: ICD-10-CM

## 2022-10-25 DIAGNOSIS — I10 ESSENTIAL HYPERTENSION: ICD-10-CM

## 2022-10-25 DIAGNOSIS — E78.41 ELEVATED LIPOPROTEIN(A): Primary | ICD-10-CM

## 2022-10-25 PROCEDURE — G8427 DOCREV CUR MEDS BY ELIG CLIN: HCPCS | Performed by: NURSE PRACTITIONER

## 2022-10-25 PROCEDURE — 3074F SYST BP LT 130 MM HG: CPT | Performed by: NURSE PRACTITIONER

## 2022-10-25 PROCEDURE — 99214 OFFICE O/P EST MOD 30 MIN: CPT | Performed by: NURSE PRACTITIONER

## 2022-10-25 PROCEDURE — 3078F DIAST BP <80 MM HG: CPT | Performed by: NURSE PRACTITIONER

## 2022-10-25 PROCEDURE — G8417 CALC BMI ABV UP PARAM F/U: HCPCS | Performed by: NURSE PRACTITIONER

## 2022-10-25 PROCEDURE — 4004F PT TOBACCO SCREEN RCVD TLK: CPT | Performed by: NURSE PRACTITIONER

## 2022-10-25 PROCEDURE — G8484 FLU IMMUNIZE NO ADMIN: HCPCS | Performed by: NURSE PRACTITIONER

## 2022-10-25 ASSESSMENT — ENCOUNTER SYMPTOMS
CHEST TIGHTNESS: 0
ABDOMINAL PAIN: 0
SHORTNESS OF BREATH: 0
DIARRHEA: 0
CONSTIPATION: 0

## 2022-10-25 ASSESSMENT — PATIENT HEALTH QUESTIONNAIRE - PHQ9
SUM OF ALL RESPONSES TO PHQ QUESTIONS 1-9: 0
1. LITTLE INTEREST OR PLEASURE IN DOING THINGS: 0
SUM OF ALL RESPONSES TO PHQ QUESTIONS 1-9: 0
SUM OF ALL RESPONSES TO PHQ QUESTIONS 1-9: 0
SUM OF ALL RESPONSES TO PHQ9 QUESTIONS 1 & 2: 0
SUM OF ALL RESPONSES TO PHQ QUESTIONS 1-9: 0
2. FEELING DOWN, DEPRESSED OR HOPELESS: 0

## 2022-10-25 NOTE — PROGRESS NOTES
Ani Lorenzo (:  1980) is a 39 y.o. female,Established patient, here for evaluation of the following chief complaint(s):  Follow-up and Hypertension         ASSESSMENT/PLAN:  1. Elevated lipoprotein(a)- has adjusted diet  Comments:  -have labs redrawn  -consider statin therapy  Orders:  -     Lipid Panel; Future  2. Essential hypertension  Comments:  -continue Valsartan  -continue Chlorthalidone  3. Hidradenitis suppurativa  Comments:  -followed by Dermatology  -continue Metformin  4. Lichen simplex chronicus  Comments:  -followed by Dermatology    Return in about 3 months (around 2023). Subjective   SUBJECTIVE/OBJECTIVE:  HPI  Hypertension  This is a new problem. Pertinent negatives include no anxiety, chest pain, neck pain, palpitations, PND or sweats. Risk factors for coronary artery disease include obesity. Past treatments include nothing. Intermittently taking medication due to stomach upset. 159-190/- home BP readings     44 yo brother recently , whom had diabetes and hypertension. Hyperpigmentation on face and breast. Hyperpigmentation on breast in same area as mask. Dark pimples on breast. Cyst on breast which intermittently drain, no drainage today. S/p mammogram no abnormalities. Has upcoming dermatology appointment     Back Pain  This is a chronic problem. The current episode started more than 1 year ago. The pain is present in the gluteal. The quality of the pain is described as aching. The pain radiates to the left thigh. The pain is mild. The symptoms are aggravated by lying down and bending. Pertinent negatives include no abdominal pain, bladder incontinence, bowel incontinence, chest pain, dysuria, fever, headaches, numbness, pelvic pain or weakness. She has tried nothing for the symptoms. Has had intermittent pain for 5 years, not taking any medication. Physical therapy is effective for 2 days. Pain reoccurs if not performing home exercises.      Most recent sinus infection in April, evaluated at Urgent Care, treated with antibiotics, effective. More frequent since relocating from [place]. Evaluated by ENT. Has discontinued drinking alcohol and attempting to lose 30 pounds. Ankle Pain  Left ankle pain started  after a slip off curb in [place]. Evaluated at facility there and Imaging revealed torn ligament and sprain. Drove home from [place], pain 9/10, ambulating with crutch. Elevating and applying ice. Evaluated by Ortho, diagnosed with sprain, has support brace in place. Obesity  General weight loss/lifestyle modification strategies discussed (elicit support from others; identify saboteurs; non-food rewards, etc). Goals:  -Eat 4-5 times daily  -Avoid high fat and high sugar foods  -Include protein with all meals and snacks  -Avoid carbonation and caffeine  -Avoid calorie containing beverages  -Increase physical activity as tolerated     Overweight/Obesity related to lack of exercise, sedentary lifestyle, unhealthy eating habits, and unsuccessful diet attempts as evidenced by BMI. Treated by Dermatologist at [facility] for Hidradenitis and Lichen simplex chronicus. Review of Systems   Constitutional:  Negative for activity change and fever. HENT:  Negative for congestion. Eyes:  Negative for visual disturbance. Respiratory:  Negative for chest tightness and shortness of breath. Cardiovascular:  Negative for chest pain, palpitations and leg swelling. Hypertension   Gastrointestinal:  Negative for abdominal pain, constipation and diarrhea. Endocrine: Negative for polyuria. Genitourinary:  Negative for dysuria. Musculoskeletal:  Negative for arthralgias and myalgias. Skin:  Negative for rash. Neurological:  Negative for dizziness, light-headedness and headaches. Psychiatric/Behavioral:  Negative for agitation, decreased concentration and sleep disturbance. The patient is not nervous/anxious.          Objective height is 5' 10\" (1.778 m) and weight is 237 lb (107.5 kg). Her temperature is 98.7 °F (37.1 °C). Her blood pressure is 119/86 and her pulse is 95. Her oxygen saturation is 99%. BP Readings from Last 3 Encounters:   10/25/22 119/86   09/30/22 117/81   09/09/22 124/78      Wt Readings from Last 3 Encounters:   10/25/22 237 lb (107.5 kg)   09/30/22 231 lb (104.8 kg)   09/15/22 (P) 236 lb (107 kg)      No past medical history on file. Past Surgical History:   Procedure Laterality Date    BREAST REDUCTION SURGERY      X2    KNEE ARTHROSCOPY Left     acl repair      Family History   Problem Relation Age of Onset    Elevated Lipids Mother     Hypertension Father     Cancer Father         lung    Diabetes Brother     Diabetes Maternal Grandfather     Heart Failure Paternal Grandmother       Social History     Tobacco Use    Smoking status: Every Day     Types: Cigarettes     Start date: 12/8/2005    Smokeless tobacco: Never    Tobacco comments:     2-3 cigarettes   Vaping Use    Vaping Use: Never used   Substance Use Topics    Alcohol use: Yes     Alcohol/week: 2.0 standard drinks     Types: 1 Glasses of wine, 1 Shots of liquor per week     Comment: 3 days/ week    Drug use: Never     Comment: cocaine and MJ in the past      Outpatient Encounter Medications as of 10/25/2022   Medication Sig Dispense Refill    metFORMIN (GLUCOPHAGE) 500 MG tablet Take 500 mg by mouth 2 times daily (with meals)      ibuprofen (ADVIL;MOTRIN) 800 MG tablet TAKE 1 TABLET BY MOUTH 3 TIMES DAILY WITH MEALS 90 tablet 5    vitamin D (ERGOCALCIFEROL) 1.25 MG (49622 UT) CAPS capsule TAKE 1 CAPSULE BY MOUTH ONE TIME PER WEEK 4 capsule 2    valsartan (DIOVAN) 40 MG tablet TAKE 1 TABLET BY MOUTH EVERY DAY 90 tablet 1    fluticasone (FLONASE) 50 MCG/ACT nasal spray 1 spray by Each Nostril route daily 32 g 1    phentermine (ADIPEX-P) 37.5 MG tablet Take 37.5 mg by mouth every morning (before breakfast).       Biotin 1 MG CAPS Take by mouth vitamin C (ASCORBIC ACID) 500 MG tablet Take 500 mg by mouth daily      chlorthalidone (HYGROTON) 25 MG tablet Take 1 tablet by mouth in the morning. 90 tablet 1    hydrocortisone 0.5 % cream Apply topically 2 times daily. 15 g 0    terbinafine (LAMISIL) 250 MG tablet Take 250 mg by mouth daily      [DISCONTINUED] traMADol (ULTRAM) 50 MG tablet Take 50 mg by mouth every 6 hours as needed for Pain. No facility-administered encounter medications on file as of 10/25/2022. Physical Exam  Vitals reviewed. Constitutional:       Appearance: She is well-developed. HENT:      Head: Normocephalic. Right Ear: Hearing and external ear normal.      Left Ear: Hearing and external ear normal.      Nose: Nose normal.   Eyes:      General: Lids are normal.   Cardiovascular:      Rate and Rhythm: Normal rate and regular rhythm. Heart sounds: Normal heart sounds, S1 normal and S2 normal.   Pulmonary:      Effort: Pulmonary effort is normal.      Breath sounds: Normal breath sounds. Musculoskeletal:         General: Normal range of motion. Skin:     General: Skin is warm and dry. Findings: No rash. Neurological:      Mental Status: She is alert and oriented to person, place, and time. GCS: GCS eye subscore is 4. GCS verbal subscore is 5. GCS motor subscore is 6. Psychiatric:         Speech: Speech normal.         Behavior: Behavior normal. Behavior is cooperative. On this date 10/25/2022 I have spent 15 minutes reviewing previous notes, test results and face to face with the patient discussing the diagnosis and importance of compliance with the treatment plan as well as documenting on the day of the visit. An electronic signature was used to authenticate this note.     --VICKI Andres - CNP

## 2022-10-28 DIAGNOSIS — E78.41 ELEVATED LIPOPROTEIN(A): ICD-10-CM

## 2022-10-28 DIAGNOSIS — E55.9 VITAMIN D DEFICIENCY: ICD-10-CM

## 2022-10-28 LAB
CHOLESTEROL, TOTAL: 214 MG/DL (ref 0–199)
HDLC SERPL-MCNC: 46 MG/DL (ref 40–60)
LDL CHOLESTEROL CALCULATED: 146 MG/DL
PARATHYROID HORMONE INTACT: 35.2 PG/ML (ref 14–72)
PHOSPHORUS: 4.3 MG/DL (ref 2.5–4.9)
TRIGL SERPL-MCNC: 108 MG/DL (ref 0–150)
VLDLC SERPL CALC-MCNC: 22 MG/DL

## 2022-11-01 RX ORDER — ATORVASTATIN CALCIUM 20 MG/1
20 TABLET, FILM COATED ORAL DAILY
Qty: 30 TABLET | Refills: 3 | Status: SHIPPED | OUTPATIENT
Start: 2022-11-01

## 2022-11-07 ENCOUNTER — TELEPHONE (OUTPATIENT)
Dept: PRIMARY CARE CLINIC | Age: 42
End: 2022-11-07

## 2022-11-07 DIAGNOSIS — L60.0 INGROWN TOENAIL: Primary | ICD-10-CM

## 2022-11-07 NOTE — TELEPHONE ENCOUNTER
Patient wants to talk with you concerning the reason why Cholesterol is elevated per test and notes on 11/1/22; please advise at [phone].  Thanks

## 2022-11-10 ENCOUNTER — TELEPHONE (OUTPATIENT)
Dept: PRIMARY CARE CLINIC | Age: 42
End: 2022-11-10

## 2022-11-10 DIAGNOSIS — E78.2 MIXED HYPERLIPIDEMIA: Primary | ICD-10-CM

## 2022-11-10 NOTE — TELEPHONE ENCOUNTER
States she is exercising, does not eat pork or beef. Hs changed eating habits since June. Call Diet Dietitian for low Cholesterol and heart healthy foods.

## 2023-02-20 RX ORDER — FLUTICASONE PROPIONATE 50 MCG
SPRAY, SUSPENSION (ML) NASAL
Qty: 1 EACH | Refills: 5 | Status: SHIPPED | OUTPATIENT
Start: 2023-02-20

## 2023-05-18 ENCOUNTER — HOSPITAL ENCOUNTER (EMERGENCY)
Age: 43
Discharge: HOME | End: 2023-05-18
Payer: COMMERCIAL

## 2023-05-18 VITALS
BODY MASS INDEX: 33.42 KG/M2 | HEART RATE: 84 BPM | HEIGHT: 70 IN | WEIGHT: 233.47 LBS | SYSTOLIC BLOOD PRESSURE: 150 MMHG | OXYGEN SATURATION: 98 % | TEMPERATURE: 96.7 F | DIASTOLIC BLOOD PRESSURE: 106 MMHG | RESPIRATION RATE: 18 BRPM

## 2023-05-18 DIAGNOSIS — I10 UNCONTROLLED HYPERTENSION: Primary | ICD-10-CM

## 2023-05-18 DIAGNOSIS — Z76.0 ENCOUNTER FOR MEDICATION REFILL: ICD-10-CM

## 2023-05-18 PROCEDURE — 99283 EMERGENCY DEPT VISIT LOW MDM: CPT

## 2023-05-18 RX ORDER — CHLORTHALIDONE 25 MG/1
25 TABLET ORAL DAILY
Qty: 30 TABLET | Refills: 0 | Status: SHIPPED | OUTPATIENT
Start: 2023-05-18

## 2023-05-18 RX ORDER — VALSARTAN 40 MG/1
40 TABLET ORAL DAILY
Qty: 30 TABLET | Refills: 0 | Status: SHIPPED | OUTPATIENT
Start: 2023-05-18

## 2023-05-18 RX ORDER — AMLODIPINE BESYLATE 5 MG/1
5 TABLET ORAL DAILY
Qty: 30 TABLET | Refills: 0 | Status: SHIPPED | OUTPATIENT
Start: 2023-05-18

## 2023-05-18 ASSESSMENT — PAIN - FUNCTIONAL ASSESSMENT
PAIN_FUNCTIONAL_ASSESSMENT: NONE - DENIES PAIN
PAIN_FUNCTIONAL_ASSESSMENT: NONE - DENIES PAIN

## 2023-05-18 NOTE — ED TRIAGE NOTES
Pt with hx of HTN. States that dr changed locations and has been unable to get meds for the last month. Pt with BP running 160/100s. Pt taking Valsartan 40mg.

## 2023-05-18 NOTE — DISCHARGE INSTRUCTIONS
I did refill your chlorthalidone 25 mg and your valsartan 40 mg.  I did add amlodipine 5 mg. This is the best combination. Monitor blood pressure. Follow-up with healthcare provider as scheduled June 1. Medications sent to [facility] pharmacy on [facility].

## 2023-05-18 NOTE — ED NOTES
D/C: Order noted for d/c. Pt confirmed d/c paperwork and three prescriptions have correct name. Discharge and education instructions reviewed with patient. Teach-back successful. Pt verbalized understanding and signed d/c papers. Pt denied questions at this time. No acute distress noted. Patient instructed to follow-up as noted - return to emergency department if symptoms worsen. Patient verbalized understanding. Discharged per EDMD with discharge instructions. Pt discharged to private vehicle. Patient stable upon departure. Thanked patient for choosing [address]  for [facility]. Provider aware of patient pain at time of discharge.        Oumou Johnson RN  05/18/23 [id number]

## 2023-05-18 NOTE — ED PROVIDER NOTES
TempSrc: Oral   SpO2: 98%   Weight: 233 lb 7.5 oz (105.9 kg)   Height: 5' 10\" (1.778 m)       Patient was given the following medications:  Medications - No data to display          Is this patient to be included in the SEP-1 Core Measure due to severe sepsis or septic shock? Hypertension, HLD    Chronic Conditions affecting care: HTN, HLD   has a past medical history of Hyperlipidemia and Hypertension. CONSULTS: (Who and What was discussed)  None    Social Determinants Significantly Affecting Health : None    Records Reviewed (External and Source) none    CC/HPI Summary, DDx, ED Course, and Reassessment:     Patient presenting primarily to get med refill. Known hypertension times years. Slight headache. I do not believe related directly to the blood pressure which her ED blood pressure 150/106. She states she has been on chlorthalidone 25 and valsartan 40. States blood pressure typically runs 130s to 140s. Her ED blood pressure 365 systolic. She does state over the past month as she has been out of medication has run upwards of 141 systolic. She reports no dizziness. No chest pain or shortness of breath. Disposition Considerations (tests considered but not done, Admit vs D/C, Shared Decision Making, Pt Expectation of Test or Tx.):     I will discharge this patient with known history hypertension and out of medication x4 weeks - 1 month. I did refill the patient's valsartan 40 mg #30x0 and [place] 25 mg #30x0 as well as did initiate amlodipine 5 mg #30x0. This should be an excellent combination based on her ethnicity. She will follow with PCP. Patient is expressed appreciation and understanding of her diagnosis and her treatment plan. I am the Primary Clinician of Record. FINAL IMPRESSION      1. Uncontrolled hypertension    2.  Encounter for medication refill          DISPOSITION/PLAN     DISPOSITION Decision To Discharge 05/18/2023 05:35:28 PM      PATIENT REFERRED TO:  Your healthcare

## 2023-10-03 ENCOUNTER — HOSPITAL ENCOUNTER (EMERGENCY)
Age: 43
Discharge: HOME | End: 2023-10-03
Attending: EMERGENCY MEDICINE

## 2023-10-03 VITALS
TEMPERATURE: 98.3 F | OXYGEN SATURATION: 97 % | RESPIRATION RATE: 18 BRPM | DIASTOLIC BLOOD PRESSURE: 78 MMHG | WEIGHT: 250.22 LBS | BODY MASS INDEX: 35.82 KG/M2 | SYSTOLIC BLOOD PRESSURE: 126 MMHG | HEIGHT: 70 IN | HEART RATE: 102 BPM

## 2023-10-03 DIAGNOSIS — T40.5X1A: Primary | ICD-10-CM

## 2023-10-03 DIAGNOSIS — F41.1 ANXIETY STATE: ICD-10-CM

## 2023-10-03 LAB
AMPHETAMINES UR QL SCN>1000 NG/ML: ABNORMAL
BACTERIA URNS QL MICRO: NORMAL /HPF
BARBITURATES UR QL SCN>200 NG/ML: ABNORMAL
BENZODIAZ UR QL SCN>200 NG/ML: ABNORMAL
BILIRUB UR QL STRIP.AUTO: NEGATIVE
CANNABINOIDS UR QL SCN>50 NG/ML: POSITIVE
CLARITY UR: CLEAR
COCAINE UR QL SCN: POSITIVE
COLOR UR: YELLOW
DRUG SCREEN COMMENT UR-IMP: ABNORMAL
EPI CELLS #/AREA URNS AUTO: 1 /HPF (ref 0–5)
FENTANYL SCREEN, URINE: ABNORMAL
GLUCOSE UR STRIP.AUTO-MCNC: NEGATIVE MG/DL
HCG UR QL: NEGATIVE
HGB UR QL STRIP.AUTO: ABNORMAL
HYALINE CASTS #/AREA URNS AUTO: 1 /LPF (ref 0–8)
KETONES UR STRIP.AUTO-MCNC: 15 MG/DL
LEUKOCYTE ESTERASE UR QL STRIP.AUTO: NEGATIVE
METHADONE UR QL SCN>300 NG/ML: ABNORMAL
NITRITE UR QL STRIP.AUTO: NEGATIVE
OPIATES UR QL SCN>300 NG/ML: ABNORMAL
OXYCODONE UR QL SCN: ABNORMAL
PCP UR QL SCN>25 NG/ML: ABNORMAL
PH UR STRIP.AUTO: 7.5 [PH] (ref 5–8)
PH UR STRIP: 7.5 [PH]
PROT UR STRIP.AUTO-MCNC: ABNORMAL MG/DL
RBC CLUMPS #/AREA URNS AUTO: 4 /HPF (ref 0–4)
SP GR UR STRIP.AUTO: 1.01 (ref 1–1.03)
UA COMPLETE W REFLEX CULTURE PNL UR: ABNORMAL
UA DIPSTICK W REFLEX MICRO PNL UR: YES
URN SPEC COLLECT METH UR: ABNORMAL
UROBILINOGEN UR STRIP-ACNC: 0.2 E.U./DL
WBC #/AREA URNS AUTO: 1 /HPF (ref 0–5)

## 2023-10-03 PROCEDURE — 80307 DRUG TEST PRSMV CHEM ANLYZR: CPT

## 2023-10-03 PROCEDURE — 84703 CHORIONIC GONADOTROPIN ASSAY: CPT

## 2023-10-03 PROCEDURE — 81001 URINALYSIS AUTO W/SCOPE: CPT

## 2023-10-03 PROCEDURE — 6370000000 HC RX 637 (ALT 250 FOR IP): Performed by: EMERGENCY MEDICINE

## 2023-10-03 PROCEDURE — 99283 EMERGENCY DEPT VISIT LOW MDM: CPT

## 2023-10-03 RX ORDER — HYDROXYZINE PAMOATE 25 MG/1
50 CAPSULE ORAL ONCE
Status: COMPLETED | OUTPATIENT
Start: 2023-10-03 | End: 2023-10-03

## 2023-10-03 RX ADMIN — HYDROXYZINE PAMOATE 50 MG: 25 CAPSULE ORAL at 20:20

## 2023-10-03 NOTE — ED PROVIDER NOTES
she knows where to go. She reports she is feeling significantly better at that time. She understands the risks of doing cocaine, particularly that you do not know what drug is in it when you are doing it. We also discussed risk of potential opiates like fentanyl or heroin and death, she tells me she has had 2 brothers die from overdoses. She continued to states she was not in any way intentionally trying to harm herself. She states she will continue to follow-up with her psychiatrist.  While she had been here she had been drinking fluids and her tongue was no longer dry. Her heart rate had improved to the low 100s, though intermittently would bounce back up to the 1 teens when she was moving around a lot. Did discuss continued monitoring until her heart rate further improved however she felt ready to go home, she has capacity, she will be with her boyfriend, she expressed understanding of return precautions. After discussion of results, diagnosis, and symptomatic care, I reiterated return precautions and importance of follow-up. She expressed understanding of all instructions, was in agreement with plan, and all questions were answered. She was discharged home in stable condition. Disposition Considerations (tests considered but not done, Shared Decision Making, Pt Expectation of Test or Tx.): Appropriate for outpatient management      I estimate there is low risk for sepsis, MI, stroke, tamponade, PTX, toxicity, or other life threatening etiology. Given the best available information and clinical assessment I estimate the risk of hospitalization to be greater than risk of treatment at home. We discussed and I explained the risk could rapidly change and return precautions and instructions given. Discharge disposition is reasonable. I am the Primary Clinician of Record. FINAL IMPRESSION      1. Accidental overdose of cocaine ([address])    2.  Anxiety state          DISPOSITION/PLAN   DISPOSITION

## 2023-10-04 NOTE — DISCHARGE INSTRUCTIONS
Your symptoms today were most likely related to your cocaine use. They were likely exacerbated because of your history of anxiety and depression. We did talk about how cocaine can cause worsening depression after you take it and cause worsening anxiety while you are on it. We do recommend quitting and that is your plan, you can do it! Follow-up with your primary care as needed. Return to emergency department for any new or worsening symptoms or concerns.

## 2024-02-28 RX ORDER — FLUTICASONE PROPIONATE 50 MCG
SPRAY, SUSPENSION (ML) NASAL
Refills: 5 | OUTPATIENT
Start: 2024-02-28

## 2024-03-06 ENCOUNTER — OFFICE VISIT (OUTPATIENT)
Dept: ENT CLINIC | Age: 44
End: 2024-03-06

## 2024-03-06 VITALS
OXYGEN SATURATION: 98 % | BODY MASS INDEX: 36.08 KG/M2 | DIASTOLIC BLOOD PRESSURE: 106 MMHG | HEIGHT: 70 IN | HEART RATE: 83 BPM | WEIGHT: 252 LBS | SYSTOLIC BLOOD PRESSURE: 149 MMHG

## 2024-03-06 DIAGNOSIS — J32.4 CHRONIC PANSINUSITIS: ICD-10-CM

## 2024-03-06 DIAGNOSIS — J34.89 NASAL SEPTAL PERFORATION: ICD-10-CM

## 2024-03-06 DIAGNOSIS — J34.89 NASAL VESTIBULITIS: Primary | ICD-10-CM

## 2024-03-06 NOTE — PROGRESS NOTES
[facility]  DIVISION OF OTOLARYNGOLOGY- HEAD & NECK SURGERY  Follow up      Patient Name: Kat Choi  Medical Record Number:  [id number]  Primary Care Physician:  Vida Fischer, VICKI - CNP  Date of Consultation: 3/6/2024    Chief Complaint: Nasal issues        Interval History    Patient is following up for nasal issues.  I have not seen her since September 2022.  She had something going on in her left maxillary sinus that I felt was odontogenic in nature.  He had this addressed with an oral surgeon.  She was doing well for a while but over the last few months he started getting significant congestion.  She has a lot of crusting in her nose.  She has been blowing out thick boogers.          REVIEW OF SYSTEMS  As above    PHYSICAL EXAM  GENERAL: No Acute Distress, Alert and Oriented, no Hoarseness, strong voice  EYES: EOMI, Anti-icteric  HENT:   Head: Normocephalic and atraumatic.   Face:  Symmetric, facial nerve intact, no sinus tenderness  Right Ear: Normal external ear  Left Ear: Normal external ear  Mouth/Oral Cavity:  normal lips, Uvula is midline, no mucosal lesions, no trismus,  Oropharynx/Larynx:  normal oropharynx  Nose:Normal external nasal appearance.  See below  NECK: Normal range of motion, no thyromegaly, trachea is midline, no lymphadenopathy, no neck masses, no crepitus          PROCEDURE  Nasal endoscopy  Afrin and lidocaine were applied the bilateral nasal cavity.  A rigid scope was used to visualize the left nasal cavity.  I removed some crusting from the anterior nasal cavity.  She has a small stable septal perforation.  She has pretty diffuse mucosal edema particularly anteriorly.  I do not see obvious purulent drainage from the middle meatus.  On the right side again quite a bit of crusting was removed.  She has a stable perforation.  Significant mucosal edema.  No obvious purulent drainage from middle meatus        ASSESSMENT/PLAN  1.  Nasal vestibulitis-she had a pretty bad nasal

## 2024-03-28 ENCOUNTER — OFFICE VISIT (OUTPATIENT)
Dept: ORTHOPEDIC SURGERY | Age: 44
End: 2024-03-28
Payer: COMMERCIAL

## 2024-03-28 VITALS — BODY MASS INDEX: 36.08 KG/M2 | RESPIRATION RATE: 16 BRPM | WEIGHT: 252 LBS | HEIGHT: 70 IN

## 2024-03-28 DIAGNOSIS — M75.22 BICEPS TENDINITIS OF LEFT SHOULDER: ICD-10-CM

## 2024-03-28 DIAGNOSIS — M25.512 ACUTE PAIN OF LEFT SHOULDER: Primary | ICD-10-CM

## 2024-03-28 DIAGNOSIS — S43.432A SUPERIOR LABRUM ANTERIOR-TO-POSTERIOR (SLAP) TEAR OF LEFT SHOULDER: ICD-10-CM

## 2024-03-28 DIAGNOSIS — E11.9 NON-INSULIN DEPENDENT TYPE 2 DIABETES MELLITUS (HCC): ICD-10-CM

## 2024-03-28 PROCEDURE — G8427 DOCREV CUR MEDS BY ELIG CLIN: HCPCS | Performed by: PHYSICIAN ASSISTANT

## 2024-03-28 PROCEDURE — G8484 FLU IMMUNIZE NO ADMIN: HCPCS | Performed by: PHYSICIAN ASSISTANT

## 2024-03-28 PROCEDURE — 3046F HEMOGLOBIN A1C LEVEL >9.0%: CPT | Performed by: ORTHOPAEDIC SURGERY

## 2024-03-28 PROCEDURE — 99213 OFFICE O/P EST LOW 20 MIN: CPT | Performed by: PHYSICIAN ASSISTANT

## 2024-03-28 PROCEDURE — 4004F PT TOBACCO SCREEN RCVD TLK: CPT | Performed by: PHYSICIAN ASSISTANT

## 2024-03-28 PROCEDURE — 2022F DILAT RTA XM EVC RTNOPTHY: CPT | Performed by: ORTHOPAEDIC SURGERY

## 2024-03-28 PROCEDURE — G8417 CALC BMI ABV UP PARAM F/U: HCPCS | Performed by: PHYSICIAN ASSISTANT

## 2024-04-03 RX ORDER — FLUTICASONE PROPIONATE 50 MCG
1 SPRAY, SUSPENSION (ML) NASAL DAILY
Qty: 1 EACH | Refills: 5 | Status: SHIPPED | OUTPATIENT
Start: 2024-04-03

## 2024-04-10 ENCOUNTER — TELEPHONE (OUTPATIENT)
Dept: ORTHOPEDIC SURGERY | Age: 44
End: 2024-04-10

## 2024-04-10 NOTE — TELEPHONE ENCOUNTER
General Question     Subject: WILBERT  Patient and /or Facility Request: TEE  Contact Number: PORTAL      APPROVED MRI ARM JOINT W/OUT CONTRACT [facility]    04/10/24/ - 05/09/24     AUTH # [id number]

## 2024-04-11 ENCOUNTER — HOSPITAL ENCOUNTER (OUTPATIENT)
Dept: MRI IMAGING | Age: 44
Discharge: HOME | End: 2024-04-11
Payer: COMMERCIAL

## 2024-04-11 DIAGNOSIS — M75.22 BICEPS TENDINITIS OF LEFT SHOULDER: ICD-10-CM

## 2024-04-11 DIAGNOSIS — S43.432A SUPERIOR LABRUM ANTERIOR-TO-POSTERIOR (SLAP) TEAR OF LEFT SHOULDER: ICD-10-CM

## 2024-04-11 PROCEDURE — 73221 MRI JOINT UPR EXTREM W/O DYE: CPT

## 2024-04-12 ENCOUNTER — OFFICE VISIT (OUTPATIENT)
Dept: ORTHOPEDIC SURGERY | Age: 44
End: 2024-04-12

## 2024-04-12 VITALS — WEIGHT: 252 LBS | BODY MASS INDEX: 36.08 KG/M2 | HEIGHT: 70 IN

## 2024-04-12 DIAGNOSIS — M94.212 CHONDROMALACIA OF LEFT SHOULDER: Primary | ICD-10-CM

## 2024-04-12 DIAGNOSIS — S43.432A DEGENERATIVE SUPERIOR LABRAL ANTERIOR-TO-POSTERIOR (SLAP) TEAR OF LEFT SHOULDER: ICD-10-CM

## 2024-04-12 DIAGNOSIS — M67.814 TENDINOSIS OF LEFT ROTATOR CUFF: ICD-10-CM

## 2024-04-12 RX ORDER — LIDOCAINE HYDROCHLORIDE 10 MG/ML
4 INJECTION, SOLUTION INFILTRATION; PERINEURAL ONCE
Status: COMPLETED | OUTPATIENT
Start: 2024-04-12 | End: 2024-04-12

## 2024-04-12 RX ORDER — TRIAMCINOLONE ACETONIDE 40 MG/ML
40 INJECTION, SUSPENSION INTRA-ARTICULAR; INTRAMUSCULAR ONCE
Status: COMPLETED | OUTPATIENT
Start: 2024-04-12 | End: 2024-04-12

## 2024-04-12 RX ORDER — BUPIVACAINE HYDROCHLORIDE 2.5 MG/ML
4 INJECTION, SOLUTION INFILTRATION; PERINEURAL ONCE
Status: COMPLETED | OUTPATIENT
Start: 2024-04-12 | End: 2024-04-12

## 2024-04-12 RX ADMIN — TRIAMCINOLONE ACETONIDE 40 MG: 40 INJECTION, SUSPENSION INTRA-ARTICULAR; INTRAMUSCULAR at 10:08

## 2024-04-12 RX ADMIN — LIDOCAINE HYDROCHLORIDE 4 ML: 10 INJECTION, SOLUTION INFILTRATION; PERINEURAL at 10:08

## 2024-04-12 RX ADMIN — BUPIVACAINE HYDROCHLORIDE 10 MG: 2.5 INJECTION, SOLUTION INFILTRATION; PERINEURAL at 10:08

## 2024-04-12 NOTE — PROGRESS NOTES
[facility]    triamcinolone acetonide (KENALOG-40) injection 40 mg    BUPivacaine (MARCAINE) 0.25 % injection 10 mg    lidocaine 1 % injection 4 mL    LA ARTHROCENTESIS ASPIR&/INJ MAJOR JT/BURSA W/O US      2. Degenerative superior labral anterior-to-posterior (SLAP) tear of left shoulder  [facility]      3. Tendinosis of left rotator cuff  [facility]              Procedures    LA ARTHROCENTESIS ASPIR&/INJ MAJOR JT/BURSA W/O US       MRI images personally reviewed and reviewed with her  Degeneration present throughout, however no significant tears  No acute surgical intervention required or recommended  I recommend course of conservative treatment  Ice/heat, Tylenol, NSAIDs as needed  Corticosteroid injection for relief, with formal physical therapy referral  Transition to lifelong dedicated home exercise program to preserve long-term health the shoulder  Strengthening work every other day only  The patient is agreeable to proceed  Risks and benefits of a steroid injection were discussed with the patient, including the possibility of adverse local site reactions such as dermal atrophy and skin discoloration.  Although rare, an infection is possible and may necessitate surgical treatment if it occurs.  Finally, a rise in blood sugar levels is anticipated, particularly in diabetics.  Diabetic patients were instructed to monitor their blood glucose levels after the injection and to adjust their insulin regimen as appropriate.  The patient elected to proceed, and after verbal consent was obtained and drug allergies were reviewed, the injection site was prepped with alcohol and ChloraPrep.  40mg of Kenalog mixed with lidocaine and marcaine (no epinephrine) was injected into the left shoulder (intra-articular).  There were no immediate complications after the procedure.  The patient was advised to ice the area intermittently over the next 24-48 hours

## 2024-06-10 SDOH — HEALTH STABILITY: PHYSICAL HEALTH: ON AVERAGE, HOW MANY DAYS PER WEEK DO YOU ENGAGE IN MODERATE TO STRENUOUS EXERCISE (LIKE A BRISK WALK)?: 5 DAYS

## 2024-06-10 SDOH — HEALTH STABILITY: PHYSICAL HEALTH: ON AVERAGE, HOW MANY MINUTES DO YOU ENGAGE IN EXERCISE AT THIS LEVEL?: 90 MIN

## 2024-06-11 ENCOUNTER — OFFICE VISIT (OUTPATIENT)
Dept: ORTHOPEDIC SURGERY | Age: 44
End: 2024-06-11
Payer: COMMERCIAL

## 2024-06-11 VITALS — HEIGHT: 70 IN | WEIGHT: 252 LBS | BODY MASS INDEX: 36.08 KG/M2

## 2024-06-11 DIAGNOSIS — M51.36 DDD (DEGENERATIVE DISC DISEASE), LUMBAR: ICD-10-CM

## 2024-06-11 DIAGNOSIS — M47.816 LUMBAR FACET ARTHROPATHY: ICD-10-CM

## 2024-06-11 DIAGNOSIS — M54.50 LUMBAR PAIN: Primary | ICD-10-CM

## 2024-06-11 PROBLEM — M51.369 DDD (DEGENERATIVE DISC DISEASE), LUMBAR: Status: ACTIVE | Noted: 2024-06-11

## 2024-06-11 PROCEDURE — 99214 OFFICE O/P EST MOD 30 MIN: CPT | Performed by: PHYSICIAN ASSISTANT

## 2024-06-11 RX ORDER — MELOXICAM 15 MG/1
15 TABLET ORAL DAILY PRN
Qty: 30 TABLET | Refills: 0 | Status: SHIPPED | OUTPATIENT
Start: 2024-06-11

## 2024-06-11 NOTE — PROGRESS NOTES
New Patient: LUMBAR SPINE    Referring Provider:  No ref. provider found    CHIEF COMPLAINT:    Chief Complaint   Patient presents with    Back Pain     lumbar       HISTORY OF PRESENT ILLNESS:       Ms. Kat Choi  is a pleasant 43 y.o. female here for evaluation regarding her LBP and left leg pain. She states her pain began insidiously over 10 years ago.  She states that her current pain is constant and she rates 10/10 within her low back with left greater than right buttock pain 9/10 and left posterior thigh pain 9/10.  She states that the pain does interfere with her sleep at night and does affect her ability to stand and walk greater than 20 minutes.  She denies any numbness, tingling, progressive weakness, bowel or bladder dysfunction or saddle anesthesia.  She finds that standing and lying down is worse than sitting.  She has been to physical therapy 2 to 3 years ago for 4-5 visits with some benefit.   Pain Assessment  Location of Pain: Back  Location Modifiers: Other (Comment)  Severity of Pain: 10  Quality of Pain: Other (Comment)  Duration of Pain: Other (Comment)  Frequency of Pain: Other (Comment)]    Current/Past Treatment:   Physical Therapy: In the past with some benefit  Chiropractic: None  Injection: None  Medications: None currently    Past Medical History:   Past Medical History:   Diagnosis Date    DDD (degenerative disc disease), lumbar     Hyperlipidemia     Hypertension     Lumbar facet arthropathy         Past Surgical History:     Past Surgical History:   Procedure Laterality Date    BREAST REDUCTION SURGERY      X2    KNEE ARTHROSCOPY Left     acl repair       Current Medications:     Current Outpatient Medications:     meloxicam (MOBIC) 15 MG tablet, Take 1 tablet by mouth daily as needed for Pain, Disp: 30 tablet, Rfl: 0    fluticasone (FLONASE) 50 MCG/ACT nasal spray, 1 spray by Nasal route daily, Disp: 1 each, Rfl: 5    amLODIPine (NORVASC) 5 MG tablet, Take 1 tablet by mouth

## 2024-06-12 ENCOUNTER — OFFICE VISIT (OUTPATIENT)
Dept: ENT CLINIC | Age: 44
End: 2024-06-12
Payer: COMMERCIAL

## 2024-06-12 ENCOUNTER — OFFICE VISIT (OUTPATIENT)
Dept: ORTHOPEDIC SURGERY | Age: 44
End: 2024-06-12
Payer: COMMERCIAL

## 2024-06-12 VITALS — WEIGHT: 252 LBS | HEIGHT: 70 IN | BODY MASS INDEX: 36.08 KG/M2

## 2024-06-12 VITALS — WEIGHT: 252 LBS | BODY MASS INDEX: 36.16 KG/M2

## 2024-06-12 DIAGNOSIS — M19.019 AC JOINT ARTHROPATHY: ICD-10-CM

## 2024-06-12 DIAGNOSIS — J34.89 NASAL VESTIBULITIS: Primary | ICD-10-CM

## 2024-06-12 DIAGNOSIS — G47.30 SLEEP DISORDER BREATHING: ICD-10-CM

## 2024-06-12 DIAGNOSIS — M67.922 BICEPS TENDINOPATHY, LEFT: ICD-10-CM

## 2024-06-12 DIAGNOSIS — J30.9 ALLERGIC RHINITIS, UNSPECIFIED SEASONALITY, UNSPECIFIED TRIGGER: ICD-10-CM

## 2024-06-12 DIAGNOSIS — J34.89 NASAL SEPTAL PERFORATION: ICD-10-CM

## 2024-06-12 DIAGNOSIS — S43.432A DEGENERATIVE SUPERIOR LABRAL ANTERIOR-TO-POSTERIOR (SLAP) TEAR OF LEFT SHOULDER: ICD-10-CM

## 2024-06-12 DIAGNOSIS — M25.612 SHOULDER STIFFNESS, LEFT: Primary | ICD-10-CM

## 2024-06-12 DIAGNOSIS — J32.9 CHRONIC SINUSITIS, UNSPECIFIED LOCATION: ICD-10-CM

## 2024-06-12 PROCEDURE — 99213 OFFICE O/P EST LOW 20 MIN: CPT | Performed by: OTOLARYNGOLOGY

## 2024-06-12 PROCEDURE — 99214 OFFICE O/P EST MOD 30 MIN: CPT | Performed by: ORTHOPAEDIC SURGERY

## 2024-06-12 RX ORDER — FLUTICASONE PROPIONATE 50 MCG
1 SPRAY, SUSPENSION (ML) NASAL DAILY
Qty: 1 EACH | Refills: 5 | Status: SHIPPED | OUTPATIENT
Start: 2024-06-12

## 2024-06-12 NOTE — PROGRESS NOTES
[facility]  DIVISION OF OTOLARYNGOLOGY- HEAD & NECK SURGERY  Follow up      Patient Name: Kat Choi  Medical Record Number:  [id number]  Primary Care Physician:  Vida Fischer, APRN - CNP  Date of Consultation: 6/12/2024    Chief Complaint: Nasal issues        Interval History  Patient following up for her nose.  I saw her in March and she had a bad vestibulitis.  She did Bactroban spray.  She says gotten a lot better.  She is doing irrigations.  She is complaining of snoring.  She does feel as though the snoring is gotten a little better with weight loss.  She does not think she has sleep apnea, but was referred to sleep medicine a while ago.  She is interested in having a sleep study.          REVIEW OF SYSTEMS    As above  PHYSICAL EXAM  GENERAL: No Acute Distress, Alert and Oriented, no Hoarseness, strong voice  EYES: EOMI, Anti-icteric  HENT:   Head: Normocephalic and atraumatic.   Face:  Symmetric, facial nerve intact, no sinus tenderness  Right Ear: Normal external ear, normal external auditory canal, intact tympanic membrane with normal mobility and aerated middle ear  Left Ear: Normal external ear, normal external auditory canal, intact tympanic membrane with normal mobility and aerated middle ear  Mouth/Oral Cavity:  normal lips, Uvula is midline, no mucosal lesions, no trismus,  Oropharynx/Larynx:  normal oropharynx,  Nose:Normal external nasal appearance.  Anterior rhinoscopy shows significant improvement and crusting.  I removed a little bit of crusting.  She has a small perforation.  No evidence of sinusitis  NECK: Normal range of motion, no thyromegaly, trachea is midline, no lymphadenopathy, no neck masses, no crepitus              ASSESSMENT/PLAN  1. Nasal vestibulitis  This has significantly improved.  I would like for her to continue to irrigate given the septal perforation    2. Nasal septal perforation  This is small and other than crusting at all think is causing her issues now.

## 2024-06-12 NOTE — PROGRESS NOTES
ORTHOPAEDIC OFFICE NOTE    Chief Complaint   Patient presents with    Follow-up     Left shoulder       Shoulder Pain     6/12/24  Patient returns to clinic today for follow-up of her left shoulder  She was given a corticosteroid injection at her last appointment, which she reports did not really help  She did not do formal physical therapy, she reports she did do some exercises with her friend who is a therapist  After we saw her last time, she was down in [place] for a while  She describes worsening range of motion  Pain is rated 10 out of 10  It is deep in nature, front to back  She denies distal radiation  She denies left hand numbness and tingling  She does describe some neck pain  Pain is worse with lifting objects, reaching across her body, turning the steering wheel  She has difficulty with sleeping  She was recently prescribed Mobic, however has not started that yet      4/12/24   Kat returns to clinic today for follow-up of her left shoulder  She reports symptoms are more or less unchanged  Pain is rated 8 out of 10  It is deep in nature  Difficulty with turning the steering wheel, lifting heavy objects up above shoulder height  She does describe intermittent left hand numbness and tingling      3/28/24  Kat is here today for new problem: Left shoulder pain  Onset 6 months, gradually worsening  No known injury  No issues with the left shoulder prior to 6 months ago  Pain is located \"deep in the joint\" with some radiation down the anterior brachium  Described as sharp and pinching  Worse with any lifting away from her body, driving, and just letting her left arm hang down at her side  Better with nothing specifically  She has tried ice, heat, ibuprofen, methocarbamol, 2 courses of oral steroids, and 2 corticosteroid injections in the shoulder given by her PCP, all with very temporary relief  Last injection was 2 months ago, with approximately 2 days of relief  Denies neck pain or N/T  Patient is a

## 2024-06-25 ENCOUNTER — HOSPITAL ENCOUNTER (OUTPATIENT)
Dept: PHYSICAL THERAPY | Age: 44
Setting detail: THERAPIES SERIES
Discharge: HOME | End: 2024-06-25
Payer: COMMERCIAL

## 2024-06-25 DIAGNOSIS — M25.512 LEFT SHOULDER PAIN, UNSPECIFIED CHRONICITY: Primary | ICD-10-CM

## 2024-06-25 PROCEDURE — 97110 THERAPEUTIC EXERCISES: CPT

## 2024-06-25 PROCEDURE — 97161 PT EVAL LOW COMPLEX 20 MIN: CPT

## 2024-06-25 NOTE — PLAN OF CARE
[facility] [address], [address], [place] [address] office: [phone] fax: [phone]     Physical Therapy Initial Evaluation Certification      Dear Amish Bowers MD,    We had the pleasure of evaluating the following patient for physical therapy services at [facility].  A summary of our findings can be found in the initial assessment below.  This includes our plan of care.  If you have any questions or concerns regarding these findings, please do not hesitate to contact me at the office phone number listed above.  Thank you for the referral.     Physician Signature:_______________________________Date:__________________  By signing above (or electronic signature), therapist’s plan is approved by physician       Physical Therapy: TREATMENT/PROGRESS NOTE   Patient: Kat Choi (43 y.o. female)   Examination Date: 2024   :  1980 MRN: [id number]   Visit #: 1   Insurance Allowable Auth Needed   AUTH []Yes    []No    Insurance: Payor: [facility] / Plan: [facility] - [place] HMO / Product Type: *No Product type* /   Insurance ID: [id number] - ([facility])  Secondary Insurance (if applicable): STELLA   Treatment Diagnosis:     ICD-10-CM    1. Left shoulder pain, unspecified chronicity  M25.512          Medical Diagnosis:  Degenerative superior labral anterior-to-posterior (SLAP) tear of left shoulder [S43.432A]  Biceps tendinopathy, left [M67.922]  AC joint arthropathy [M19.019]  Shoulder stiffness, left [M25.612]   Referring Physician: Amish Bowers MD  PCP: Vida Fischer APRN - CNP     Plan of care signed (Y/N):     Date of Patient follow up with Physician:      Progress Report/POC: EVAL today  POC update due: (10 visits /OR AUTH LIMITS, whichever is less)  2024                                             Precautions/ Contra-indications:           Latex allergy:  NO  Pacemaker:    NO  Contraindications for

## 2024-07-08 RX ORDER — MELOXICAM 15 MG/1
15 TABLET ORAL DAILY PRN
Qty: 30 TABLET | Refills: 0 | Status: SHIPPED | OUTPATIENT
Start: 2024-07-08

## 2024-08-05 ENCOUNTER — OFFICE VISIT (OUTPATIENT)
Dept: SLEEP MEDICINE | Age: 44
End: 2024-08-05

## 2024-08-05 VITALS
SYSTOLIC BLOOD PRESSURE: 100 MMHG | HEART RATE: 84 BPM | TEMPERATURE: 97.9 F | OXYGEN SATURATION: 98 % | BODY MASS INDEX: 32.57 KG/M2 | DIASTOLIC BLOOD PRESSURE: 60 MMHG | HEIGHT: 70 IN | WEIGHT: 227.54 LBS | RESPIRATION RATE: 18 BRPM

## 2024-08-05 DIAGNOSIS — G47.19 EXCESSIVE DAYTIME SLEEPINESS: ICD-10-CM

## 2024-08-05 DIAGNOSIS — R06.83 SNORING: ICD-10-CM

## 2024-08-05 DIAGNOSIS — E66.01 CLASS 2 SEVERE OBESITY DUE TO EXCESS CALORIES WITH SERIOUS COMORBIDITY AND BODY MASS INDEX (BMI) OF 36.0 TO 36.9 IN ADULT (HCC): ICD-10-CM

## 2024-08-05 DIAGNOSIS — I10 ESSENTIAL HYPERTENSION: ICD-10-CM

## 2024-08-05 DIAGNOSIS — G47.33 OSA (OBSTRUCTIVE SLEEP APNEA): Primary | ICD-10-CM

## 2024-08-05 PROCEDURE — 3074F SYST BP LT 130 MM HG: CPT | Performed by: PSYCHIATRY & NEUROLOGY

## 2024-08-05 PROCEDURE — 3078F DIAST BP <80 MM HG: CPT | Performed by: PSYCHIATRY & NEUROLOGY

## 2024-08-05 PROCEDURE — 99204 OFFICE O/P NEW MOD 45 MIN: CPT | Performed by: PSYCHIATRY & NEUROLOGY

## 2024-08-05 RX ORDER — SECUKINUMAB 300 MG/2ML
300 INJECTION SUBCUTANEOUS
COMMUNITY
Start: 2024-04-15

## 2024-08-05 RX ORDER — OMEPRAZOLE 20 MG/1
CAPSULE, DELAYED RELEASE ORAL
COMMUNITY
Start: 2024-06-28

## 2024-08-05 RX ORDER — TRETINOIN 0.5 MG/G
CREAM TOPICAL
COMMUNITY
Start: 2024-07-30

## 2024-08-05 RX ORDER — BUPROPION HYDROCHLORIDE 300 MG/1
300 TABLET ORAL DAILY
COMMUNITY
Start: 2024-07-14

## 2024-08-05 RX ORDER — DOXYCYCLINE 100 MG/1
CAPSULE ORAL
COMMUNITY

## 2024-08-05 RX ORDER — SEMAGLUTIDE 1.34 MG/ML
INJECTION, SOLUTION SUBCUTANEOUS
COMMUNITY
Start: 2024-04-01

## 2024-08-05 ASSESSMENT — SLEEP AND FATIGUE QUESTIONNAIRES
HOW LIKELY ARE YOU TO NOD OFF OR FALL ASLEEP WHEN YOU ARE A PASSENGER IN A CAR FOR AN HOUR WITHOUT A BREAK: WOULD NEVER DOZE
HOW LIKELY ARE YOU TO NOD OFF OR FALL ASLEEP WHILE SITTING QUIETLY AFTER LUNCH WITHOUT ALCOHOL: WOULD NEVER DOZE
HOW LIKELY ARE YOU TO NOD OFF OR FALL ASLEEP WHILE SITTING AND READING: WOULD NEVER DOZE
HOW LIKELY ARE YOU TO NOD OFF OR FALL ASLEEP IN A CAR, WHILE STOPPED FOR A FEW MINUTES IN TRAFFIC: WOULD NEVER DOZE
HOW LIKELY ARE YOU TO NOD OFF OR FALL ASLEEP WHILE WATCHING TV: SLIGHT CHANCE OF DOZING
HOW LIKELY ARE YOU TO NOD OFF OR FALL ASLEEP WHILE SITTING INACTIVE IN A PUBLIC PLACE: WOULD NEVER DOZE
HOW LIKELY ARE YOU TO NOD OFF OR FALL ASLEEP WHILE LYING DOWN TO REST IN THE AFTERNOON WHEN CIRCUMSTANCES PERMIT: SLIGHT CHANCE OF DOZING
HOW LIKELY ARE YOU TO NOD OFF OR FALL ASLEEP WHILE SITTING AND TALKING TO SOMEONE: WOULD NEVER DOZE
ESS TOTAL SCORE: 2

## 2024-08-05 ASSESSMENT — ENCOUNTER SYMPTOMS
GASTROINTESTINAL NEGATIVE: 1
RESPIRATORY NEGATIVE: 1
EYES NEGATIVE: 1
ALLERGIC/IMMUNOLOGIC NEGATIVE: 1

## 2024-08-05 NOTE — PATIENT INSTRUCTIONS
Orders Placed This Encounter   Procedures    Home Sleep Study     Standing Status:   Future     Standing Expiration Date:   8/5/2025     Order Specific Question:   Location For Sleep Study     Answer:   [place]     Order Specific Question:   Select Sleep Lab Location     Answer:   [facility]

## 2024-08-05 NOTE — PROGRESS NOTES
MD CLINT Oconnor Board Certified in Sleep Medicine  [facility]  Board Certified in Neurology [facility]  [address]   [address]  [place]  [address]  P- [phone]   [facility]   [address]  [address]   [place], [place] [address]           [facility] [place] [facility]  [address] [address] [phone]    Subjective:     Patient ID: Kat Choi is a 43 y.o. female.    Chief Complaint   Patient presents with    [facility]    Sleep Apnea       HPI:        Kat Choi is a 43 y.o. female referred by Dr. Blake for a sleep evaluation. She complains of snoring, tossing and turning, decreased concentration, feels sleepy during the day, take naps during the day but she denies snorting, choking, periods of not breathing, knees buckling with laughing, completely or partially paralyzed while falling asleep or waking up.  Symptoms began several years ago, gradually worsening since that time.   The patient's bed-partner confirmed the snoring without the stopped breathing at night  SLEEP SCHEDULE: Goes to bed around 11 PM in the weekdays and 1 AM in the weekends. It usually takes the patient 15 minutes to fall asleep. The patient gets up 1-2 per night to go to the bathroom. The Patient finally gets up at 6 AM during the weekdays and 10 AM in the weekends. patient wakes up with dry mouth   The patient has restless sleep with frequent arousals in addition to the Patient has significant daytime sleepiness. The Patient scored Millstone Sleepiness Score: 2 on Millstone Sleepiness Scale ( more than 10 is indicative of daytime sleepiness)and 18 in fatigue scale ( more than 36 is indicative of daytime fatigue). The patient takes 1-2 naps/week or 60 minutes and usually is not refreshing nap.     Previous evaluation and treatment has included- none.    The

## 2024-08-12 ENCOUNTER — HOSPITAL ENCOUNTER (OUTPATIENT)
Dept: SLEEP CENTER | Age: 44
Discharge: HOME | End: 2024-08-12
Attending: PSYCHIATRY & NEUROLOGY
Payer: COMMERCIAL

## 2024-08-12 DIAGNOSIS — G47.33 OSA (OBSTRUCTIVE SLEEP APNEA): ICD-10-CM

## 2024-08-12 PROCEDURE — 95806 SLEEP STUDY UNATT&RESP EFFT: CPT

## 2024-08-13 PROBLEM — G47.33 OSA (OBSTRUCTIVE SLEEP APNEA): Status: ACTIVE | Noted: 2024-08-13

## 2024-08-13 PROCEDURE — 95806 SLEEP STUDY UNATT&RESP EFFT: CPT | Performed by: PSYCHIATRY & NEUROLOGY

## 2024-08-13 NOTE — PROGRESS NOTES
Kat Choi         : 1980  [] [facility]     [] [facility]      [] [facility]     [][facility]    [] [facility]  [] [facility]  [] [facility]   [] [facility] [] Other:  Diagnosis: [x] CHRISTINE (G47.33) [] CSA (G47.31) [] Apnea (G47.30)   Length of Need: [] 12 Months [x] 99 Months [] Other:    Machine (TELMA!):  [x] ResMed AirSense     Auto [] Other:     [x]  CPAP () [] Bilevel ()   Mode: [x] Auto [] Spontaneous    Mode: [] Auto [] Spontaneous           Between 5 and 15 cm                 Comfort Settings:     If the order for CPAP  - Ramp Pressure: 5 cmH2O                                        - Ramp time: 15 min                                     -  Flex/EPR - 3 full time       Humidifier: [x] Heated ()        [x] Water chamber replacement ()/ 1 per 6 months        Mask:  Please always start with the mask the patient used during the titraion   [x] Nasal () /1 per 3 months [x] Full Face () /1 per 3 months   [x] Patient choice -Size and fit mask [x] Patient Choice - Size and fit mask   [] Dispense:  [] Dispense:    [x] Headgear () / 1 per 6 months [x] Headgear () / 1 per 3 months   [x] Replacement Nasal Cushion ()/2 per month [x] Interface Replacement ()/1 per month   [x] Replacement Nasal Pillows ()/2 per month         Tubing: [x] Heated ()/1 per 3 months    [] Standard ()/1 per 3 months [] Other:           Filters: [x] Non-disposable ()/1 per 6 months     [x] Ultra-Fine, Disposable ()/2 per month        Miscellaneous: [x] Chin Strap ()/ 1 per 6 months [] O2 bleed-in:       LPM   [] Oximetry on CPAP/Bilevel []  Other:          Start Order Date: 24    MEDICAL JUSTIFICATION:  I, the undersigned, certify that the above prescribed supplies are medically necessary for this patient’s wellbeing.  In my opinion, the supplies are both reasonable and necessary in reference to accepted standards of medicalpractice in

## 2024-08-15 ENCOUNTER — TELEPHONE (OUTPATIENT)
Dept: PULMONOLOGY | Age: 44
End: 2024-08-15

## 2024-08-15 NOTE — TELEPHONE ENCOUNTER
HOME Sleep study showed Moderate CHRISTINE (on a scale of mild, moderate and severe).  AHI was 15.7  per hr. (Average times per hr breathing was obstructed).  O2 Desaturations to 85% (lowest o2)   Dr Recommends:    Follow up with the patient's sleep physician to discuss results      Avoid sedatives, alcohol and caffeinated drinks at bedtime.    Recommend:  APAP between 5 and 15 cm     Sleep Lab used:  [facility]    Avoid driving while sleepy.     Pt notified of message  Wcb with DME

## 2024-08-19 RX ORDER — MELOXICAM 15 MG/1
15 TABLET ORAL DAILY PRN
Qty: 30 TABLET | Refills: 0 | Status: SHIPPED | OUTPATIENT
Start: 2024-08-19

## 2024-08-20 ENCOUNTER — APPOINTMENT (OUTPATIENT)
Dept: GENERAL RADIOLOGY | Age: 44
End: 2024-08-20
Payer: COMMERCIAL

## 2024-08-20 ENCOUNTER — HOSPITAL ENCOUNTER (EMERGENCY)
Age: 44
Discharge: HOME | End: 2024-08-21
Attending: STUDENT IN AN ORGANIZED HEALTH CARE EDUCATION/TRAINING PROGRAM
Payer: COMMERCIAL

## 2024-08-20 ENCOUNTER — APPOINTMENT (OUTPATIENT)
Dept: CT IMAGING | Age: 44
End: 2024-08-20
Payer: COMMERCIAL

## 2024-08-20 DIAGNOSIS — E83.42 HYPOMAGNESEMIA: ICD-10-CM

## 2024-08-20 DIAGNOSIS — R07.89 CHEST WALL PAIN: ICD-10-CM

## 2024-08-20 DIAGNOSIS — R07.89 ATYPICAL CHEST PAIN: ICD-10-CM

## 2024-08-20 DIAGNOSIS — R91.1 NODULE OF LOWER LOBE OF RIGHT LUNG: ICD-10-CM

## 2024-08-20 DIAGNOSIS — E87.6 HYPOKALEMIA: ICD-10-CM

## 2024-08-20 DIAGNOSIS — R94.31 NONSPECIFIC ST-T WAVE ELECTROCARDIOGRAPHIC CHANGES: Primary | ICD-10-CM

## 2024-08-20 LAB
ALBUMIN SERPL-MCNC: 4.2 G/DL (ref 3.4–5)
ALBUMIN/GLOB SERPL: 1.3 {RATIO} (ref 1.1–2.2)
ALP SERPL-CCNC: 80 U/L (ref 40–129)
ALT SERPL-CCNC: 22 U/L (ref 10–40)
ANION GAP SERPL CALCULATED.3IONS-SCNC: 12 MMOL/L (ref 3–16)
AST SERPL-CCNC: 27 U/L (ref 15–37)
BILIRUB SERPL-MCNC: 0.5 MG/DL (ref 0–1)
BUN SERPL-MCNC: 8 MG/DL (ref 7–20)
CALCIUM SERPL-MCNC: 9.1 MG/DL (ref 8.3–10.6)
CHLORIDE SERPL-SCNC: 98 MMOL/L (ref 99–110)
CO2 SERPL-SCNC: 27 MMOL/L (ref 21–32)
CREAT SERPL-MCNC: 1 MG/DL (ref 0.6–1.1)
D-DIMER QUANTITATIVE: 0.52 UG/ML FEU (ref 0–0.6)
GFR SERPLBLD CREATININE-BSD FMLA CKD-EPI: 71 ML/MIN/{1.73_M2}
GLUCOSE SERPL-MCNC: 109 MG/DL (ref 70–99)
POTASSIUM SERPL-SCNC: 3.3 MMOL/L (ref 3.5–5.1)
PROT SERPL-MCNC: 7.4 G/DL (ref 6.4–8.2)
SODIUM SERPL-SCNC: 137 MMOL/L (ref 136–145)
TROPONIN, HIGH SENSITIVITY: <6 NG/L (ref 0–14)

## 2024-08-20 PROCEDURE — 84484 ASSAY OF TROPONIN QUANT: CPT

## 2024-08-20 PROCEDURE — 85025 COMPLETE CBC W/AUTO DIFF WBC: CPT

## 2024-08-20 PROCEDURE — 6360000002 HC RX W HCPCS: Performed by: STUDENT IN AN ORGANIZED HEALTH CARE EDUCATION/TRAINING PROGRAM

## 2024-08-20 PROCEDURE — 80053 COMPREHEN METABOLIC PANEL: CPT

## 2024-08-20 PROCEDURE — 71045 X-RAY EXAM CHEST 1 VIEW: CPT

## 2024-08-20 PROCEDURE — 6370000000 HC RX 637 (ALT 250 FOR IP): Performed by: STUDENT IN AN ORGANIZED HEALTH CARE EDUCATION/TRAINING PROGRAM

## 2024-08-20 PROCEDURE — 96374 THER/PROPH/DIAG INJ IV PUSH: CPT

## 2024-08-20 PROCEDURE — 85379 FIBRIN DEGRADATION QUANT: CPT

## 2024-08-20 PROCEDURE — 84703 CHORIONIC GONADOTROPIN ASSAY: CPT

## 2024-08-20 PROCEDURE — 83735 ASSAY OF MAGNESIUM: CPT

## 2024-08-20 PROCEDURE — 99285 EMERGENCY DEPT VISIT HI MDM: CPT

## 2024-08-20 PROCEDURE — 93005 ELECTROCARDIOGRAM TRACING: CPT | Performed by: STUDENT IN AN ORGANIZED HEALTH CARE EDUCATION/TRAINING PROGRAM

## 2024-08-20 RX ORDER — KETOROLAC TROMETHAMINE 15 MG/ML
15 INJECTION, SOLUTION INTRAMUSCULAR; INTRAVENOUS ONCE
Status: COMPLETED | OUTPATIENT
Start: 2024-08-20 | End: 2024-08-20

## 2024-08-20 RX ADMIN — POTASSIUM BICARBONATE 20 MEQ: 782 TABLET, EFFERVESCENT ORAL at 23:44

## 2024-08-20 RX ADMIN — KETOROLAC TROMETHAMINE 15 MG: 15 INJECTION, SOLUTION INTRAMUSCULAR; INTRAVENOUS at 23:45

## 2024-08-20 ASSESSMENT — PAIN SCALES - GENERAL
PAINLEVEL_OUTOF10: 9
PAINLEVEL_OUTOF10: 8

## 2024-08-20 ASSESSMENT — PAIN DESCRIPTION - LOCATION: LOCATION: CHEST

## 2024-08-20 ASSESSMENT — PAIN DESCRIPTION - FREQUENCY: FREQUENCY: CONTINUOUS

## 2024-08-20 ASSESSMENT — PAIN DESCRIPTION - DESCRIPTORS: DESCRIPTORS: SHARP

## 2024-08-20 ASSESSMENT — PAIN DESCRIPTION - PAIN TYPE: TYPE: ACUTE PAIN

## 2024-08-20 ASSESSMENT — PAIN DESCRIPTION - ORIENTATION: ORIENTATION: RIGHT;LEFT;UPPER

## 2024-08-20 ASSESSMENT — PAIN - FUNCTIONAL ASSESSMENT: PAIN_FUNCTIONAL_ASSESSMENT: 0-10

## 2024-08-21 ENCOUNTER — APPOINTMENT (OUTPATIENT)
Dept: CT IMAGING | Age: 44
End: 2024-08-21
Payer: COMMERCIAL

## 2024-08-21 VITALS
BODY MASS INDEX: 31.73 KG/M2 | OXYGEN SATURATION: 99 % | WEIGHT: 221.12 LBS | RESPIRATION RATE: 19 BRPM | DIASTOLIC BLOOD PRESSURE: 76 MMHG | SYSTOLIC BLOOD PRESSURE: 102 MMHG | HEART RATE: 99 BPM | TEMPERATURE: 98.3 F

## 2024-08-21 LAB
BASOPHILS # BLD: 0.1 K/UL (ref 0–0.2)
BASOPHILS NFR BLD: 1.1 %
DEPRECATED RDW RBC AUTO: 13.5 % (ref 12.4–15.4)
EKG ATRIAL RATE: 83 BPM
EKG DIAGNOSIS: NORMAL
EKG P AXIS: 33 DEGREES
EKG P-R INTERVAL: 162 MS
EKG Q-T INTERVAL: 408 MS
EKG QRS DURATION: 110 MS
EKG QTC CALCULATION (BAZETT): 479 MS
EKG R AXIS: 12 DEGREES
EKG T AXIS: 32 DEGREES
EKG VENTRICULAR RATE: 83 BPM
EOSINOPHIL # BLD: 0.1 K/UL (ref 0–0.6)
EOSINOPHIL NFR BLD: 1.2 %
HCG SERPL QL: NEGATIVE
HCT VFR BLD AUTO: 44.9 % (ref 36–48)
HGB BLD-MCNC: 15.6 G/DL (ref 12–16)
LYMPHOCYTES # BLD: 2.6 K/UL (ref 1–5.1)
LYMPHOCYTES NFR BLD: 39.8 %
MAGNESIUM SERPL-MCNC: 1.65 MG/DL (ref 1.8–2.4)
MCH RBC QN AUTO: 32.1 PG (ref 26–34)
MCHC RBC AUTO-ENTMCNC: 34.7 G/DL (ref 31–36)
MCV RBC AUTO: 92.5 FL (ref 80–100)
MONOCYTES # BLD: 0.5 K/UL (ref 0–1.3)
MONOCYTES NFR BLD: 7.9 %
NEUTROPHILS # BLD: 3.2 K/UL (ref 1.7–7.7)
NEUTROPHILS NFR BLD: 50 %
PLATELET # BLD AUTO: 292 K/UL (ref 135–450)
PLATELET BLD QL SMEAR: ADEQUATE
PMV BLD AUTO: 8.7 FL (ref 5–10.5)
RBC # BLD AUTO: 4.86 M/UL (ref 4–5.2)
SLIDE REVIEW: NORMAL
TROPONIN, HIGH SENSITIVITY: <6 NG/L (ref 0–14)
WBC # BLD AUTO: 6.5 K/UL (ref 4–11)

## 2024-08-21 PROCEDURE — 6370000000 HC RX 637 (ALT 250 FOR IP): Performed by: STUDENT IN AN ORGANIZED HEALTH CARE EDUCATION/TRAINING PROGRAM

## 2024-08-21 PROCEDURE — 93010 ELECTROCARDIOGRAM REPORT: CPT | Performed by: INTERNAL MEDICINE

## 2024-08-21 PROCEDURE — 71260 CT THORAX DX C+: CPT

## 2024-08-21 PROCEDURE — 6360000004 HC RX CONTRAST MEDICATION: Performed by: STUDENT IN AN ORGANIZED HEALTH CARE EDUCATION/TRAINING PROGRAM

## 2024-08-21 RX ORDER — LANOLIN ALCOHOL/MO/W.PET/CERES
400 CREAM (GRAM) TOPICAL ONCE
Status: COMPLETED | OUTPATIENT
Start: 2024-08-21 | End: 2024-08-21

## 2024-08-21 RX ADMIN — IOPAMIDOL 75 ML: 755 INJECTION, SOLUTION INTRAVENOUS at 00:06

## 2024-08-21 RX ADMIN — Medication 400 MG: at 02:19

## 2024-08-21 ASSESSMENT — PAIN SCALES - GENERAL: PAINLEVEL_OUTOF10: 6

## 2024-08-21 NOTE — ED PROVIDER NOTES
[facility] EMERGENCY DEPARTMENT    CHIEF COMPLAINT  Chest Pain (Pt presents to the ER with the complaint of bilateral chest pain. Pt states pain is worse with movement, palpation and inspiration. Pt states the pain began yesterday. Pt denies any nausea of vomiting. )       HISTORY OF PRESENT ILLNESS  Kat Choi is a 43 y.o. female who presents to the ED with chest pain for the past day.  Sharp, worse with inspiration, L sided parasternal.  Worse with touching the area.  No SOB, n/v/diaphoresis.  Worked out earlier in the day and had no pain during her workout.  Symptoms not otherwise alleviated or exacerbated by other factors.      I have reviewed the following from the nursing documentation:    Past Medical History:   Diagnosis Date    DDD (degenerative disc disease), lumbar     Hyperlipidemia     Hypertension     Lumbar facet arthropathy      Past Surgical History:   Procedure Laterality Date    BREAST REDUCTION SURGERY      X2    KNEE ARTHROSCOPY Left     acl repair    KNEE SURGERY  1998     Family History   Problem Relation Age of Onset    Elevated Lipids Mother     Hypertension Father     Cancer Father         lung    Diabetes Brother     Diabetes Maternal Grandfather     Heart Failure Paternal Grandmother      Social History     Socioeconomic History    Marital status:      Spouse name: Not on file    Number of children: 1    Years of education: Not on file    Highest education level: Not on file   Occupational History    Occupation: unemployed   Tobacco Use    Smoking status: Former     Current packs/day: 0.50     Average packs/day: 0.5 packs/day for 1.7 years (0.9 ttl pk-yrs)     Types: Cigarettes     Start date: 12/8/2022     Passive exposure: Past    Smokeless tobacco: Never   Vaping Use    Vaping status: Never Used   Substance and Sexual Activity    Alcohol use: Yes     Alcohol/week: 4.0 standard drinks of alcohol     Types: 1 Glasses of wine, 3 Shots of liquor per week

## 2024-09-10 ENCOUNTER — OFFICE VISIT (OUTPATIENT)
Dept: CARDIOLOGY CLINIC | Age: 44
End: 2024-09-10
Payer: COMMERCIAL

## 2024-09-10 VITALS
OXYGEN SATURATION: 100 % | BODY MASS INDEX: 32.07 KG/M2 | HEIGHT: 70 IN | SYSTOLIC BLOOD PRESSURE: 114 MMHG | DIASTOLIC BLOOD PRESSURE: 74 MMHG | HEART RATE: 89 BPM | WEIGHT: 224 LBS

## 2024-09-10 DIAGNOSIS — I10 ESSENTIAL HYPERTENSION: ICD-10-CM

## 2024-09-10 DIAGNOSIS — R07.89 NON-CARDIAC CHEST PAIN: ICD-10-CM

## 2024-09-10 DIAGNOSIS — E78.2 MIXED HYPERLIPIDEMIA: ICD-10-CM

## 2024-09-10 DIAGNOSIS — R94.31 ABNORMAL EKG: Primary | ICD-10-CM

## 2024-09-10 PROCEDURE — 3074F SYST BP LT 130 MM HG: CPT | Performed by: INTERNAL MEDICINE

## 2024-09-10 PROCEDURE — 99204 OFFICE O/P NEW MOD 45 MIN: CPT | Performed by: INTERNAL MEDICINE

## 2024-09-10 PROCEDURE — 3078F DIAST BP <80 MM HG: CPT | Performed by: INTERNAL MEDICINE

## 2024-10-01 RX ORDER — MELOXICAM 15 MG/1
15 TABLET ORAL DAILY PRN
Qty: 30 TABLET | Refills: 0 | Status: SHIPPED | OUTPATIENT
Start: 2024-10-01

## 2024-10-28 RX ORDER — MELOXICAM 15 MG/1
15 TABLET ORAL DAILY PRN
Qty: 30 TABLET | Refills: 0 | Status: SHIPPED | OUTPATIENT
Start: 2024-10-28

## 2024-11-25 RX ORDER — MELOXICAM 15 MG/1
15 TABLET ORAL DAILY PRN
Qty: 30 TABLET | Refills: 0 | Status: SHIPPED | OUTPATIENT
Start: 2024-11-25

## 2024-12-30 RX ORDER — MELOXICAM 15 MG/1
15 TABLET ORAL DAILY PRN
Qty: 30 TABLET | Refills: 0 | Status: SHIPPED | OUTPATIENT
Start: 2024-12-30

## 2025-02-14 ENCOUNTER — OFFICE VISIT (OUTPATIENT)
Dept: ORTHOPEDIC SURGERY | Age: 45
End: 2025-02-14
Payer: MEDICAID

## 2025-02-14 VITALS — HEIGHT: 70 IN | WEIGHT: 224 LBS | RESPIRATION RATE: 16 BRPM | BODY MASS INDEX: 32.07 KG/M2

## 2025-02-14 DIAGNOSIS — M19.012 GLENOHUMERAL ARTHRITIS, LEFT: Primary | ICD-10-CM

## 2025-02-14 PROCEDURE — 99214 OFFICE O/P EST MOD 30 MIN: CPT | Performed by: ORTHOPAEDIC SURGERY

## 2025-02-14 NOTE — PROGRESS NOTES
CALCIUM 9.2 11/25/2024    CALCIUM 9.2 11/25/2024    BILITOT <0.2 11/25/2024    BILITOT <0.2 11/25/2024    ALKPHOS 71 11/25/2024    ALKPHOS 72 11/25/2024    AST 19 11/25/2024    AST 20 11/25/2024    ALT 15 11/25/2024    ALT 15 11/25/2024    LABGLOM >90 11/25/2024    LABGLOM >90 11/25/2024    GFRAA >60 07/28/2022    AGRATIO 1.8 11/25/2024    AGRATIO 1.8 11/25/2024     No results found for: \"INR\"  No results found for: \"APTT\"  Lab Results   Component Value Date    LABA1C 5.0 11/25/2024         Assessment & Plan:  44 y.o. female who presents with    Diagnosis Orders   1. Glenohumeral arthritis, left  XR SHOULDER LEFT (MIN 2 VIEWS)    [facility] - Gala Williamson MD, Rheumatology, [facility]          No orders of the defined types were placed in this encounter.      Conservative treatment of shoulder symptoms/arthritis:  1)  Recommend ice and anti-inflammatories to reduce pain and swelling.  Patient can also use topical creams/patches, topical NSAIDs, and Tylenol as well.  2)  Oral narcotics, including tramadol, result in a significant increase of adverse events and are not effective at improving pain or function for treatment of osteoarthritis.  3)  Avoid aggravating activities and positions.  Avoid heavy lifting, avoid lifting away from the body, avoid lifting overhead.  Keep most activities between chest and waist level.  If you have to lift, keep arms/elbows next to your body/torso.  Maintain range of motion however to prevent stiffness.  4)  Physical therapy referral to improve range of motion, strength, and function.  She is maintaining a home exercise program.  5)  Corticosteroid shoulder injections are an option.  However, I typically do not recommend serial injections in the shoulder due to known adverse effects with multiple steroid injections in the shoulder.      She has already had 3 prior corticosteroid injections, without any significant long-term relief.  I do not recommend repeating this.  I recommend

## 2025-03-04 ENCOUNTER — TRANSCRIBE ORDERS (OUTPATIENT)
Dept: ADMINISTRATIVE | Age: 45
End: 2025-03-04

## 2025-03-04 DIAGNOSIS — M25.512 LEFT SHOULDER PAIN, UNSPECIFIED CHRONICITY: Primary | ICD-10-CM

## 2025-03-10 ENCOUNTER — TRANSCRIBE ORDERS (OUTPATIENT)
Dept: ADMINISTRATIVE | Age: 45
End: 2025-03-10

## 2025-03-10 DIAGNOSIS — M25.512 LEFT SHOULDER PAIN, UNSPECIFIED CHRONICITY: Primary | ICD-10-CM

## 2025-03-15 ENCOUNTER — HOSPITAL ENCOUNTER (OUTPATIENT)
Dept: MRI IMAGING | Age: 45
Discharge: HOME | End: 2025-03-15
Attending: INTERNAL MEDICINE
Payer: COMMERCIAL

## 2025-03-15 DIAGNOSIS — M25.512 LEFT SHOULDER PAIN, UNSPECIFIED CHRONICITY: ICD-10-CM

## 2025-03-15 PROCEDURE — 73221 MRI JOINT UPR EXTREM W/O DYE: CPT

## 2025-03-27 ENCOUNTER — TELEPHONE (OUTPATIENT)
Dept: ORTHOPEDIC SURGERY | Age: 45
End: 2025-03-27

## 2025-03-27 NOTE — TELEPHONE ENCOUNTER
Called and spoke with patient advised to try OTC meds such as ibuprofen and or tylenol, salonpas pain patches and ice frquently and we will see her at her appt on 3/31/2025  patient verbalized understanding

## 2025-03-27 NOTE — TELEPHONE ENCOUNTER
General Question     Subject: Pt COMING IN MONDAY - PAIN RELIEF IN THE MEANTIME   Patient and /or Facility Request: Jimbo Kat D   Contact Number: [phone]      Pt WENT TO RHEUMATOLOGY AND THEY DID THE MRI. THERE IS A TEAR, SO SHE IS COMING BACK TO DR GALAN 03/31.     SHE ISN'T ABLE TO SLEEP FOR THE PAIN, AND IT IS PAINFUL DURING THE DAY. IS THERE ANYTHING SHE CAN TAKE FOR THIS, UNTIL SHE IS SEEN ON MONDAY? PLEASE ADVISE.      [facility]/pharmacy #[id number] - [place] - [address] - P [phone] - F [phone]

## 2025-03-31 ENCOUNTER — OFFICE VISIT (OUTPATIENT)
Dept: ORTHOPEDIC SURGERY | Age: 45
End: 2025-03-31
Payer: COMMERCIAL

## 2025-03-31 VITALS — WEIGHT: 224 LBS | RESPIRATION RATE: 16 BRPM | BODY MASS INDEX: 32.07 KG/M2 | HEIGHT: 70 IN

## 2025-03-31 DIAGNOSIS — M19.012 GLENOHUMERAL ARTHRITIS, LEFT: Primary | ICD-10-CM

## 2025-03-31 PROCEDURE — 99213 OFFICE O/P EST LOW 20 MIN: CPT | Performed by: ORTHOPAEDIC SURGERY

## 2025-03-31 NOTE — PROGRESS NOTES
ORTHOPAEDIC OFFICE NOTE    Chief Complaint   Patient presents with    Follow-up     Mri left shoulder         3/31/25  Patient returns to clinic today for follow-up of her left shoulder  She reports her left shoulder symptoms and pain are unchanged  She did see rheumatology Dr. Williamson, who ran some lab tests and ordered a new MRI of the left shoulder  She reports she could not get the results from her MRI from Dr Williamson, and therefore scheduled an appointment to see me to review the MRI and to discuss      2/14/25  Kat returns to clinic today for her left shoulder  After we last saw her in June, she started taking meloxicam with improvement  She went to initial PT session, maintained HEP  Due to insurance issues, she reports could not do more PT sessions  Overall the shoulder got better, however, more recently the pain has returned  Anterior to posterior, deep  She does computer work, accounting  No MSK pain elsewhere        6/12/24  Patient returns to clinic today for follow-up of her left shoulder  She was given a corticosteroid injection at her last appointment, which she reports did not really help  She did not do formal physical therapy, she reports she did do some exercises with her friend who is a therapist  After we saw her last time, she was down in [place] for a while  She describes worsening range of motion  Pain is rated 10 out of 10  It is deep in nature, front to back  She denies distal radiation  She denies left hand numbness and tingling  She does describe some neck pain  Pain is worse with lifting objects, reaching across her body, turning the steering wheel  She has difficulty with sleeping  She was recently prescribed Mobic, however has not started that yet      4/12/24   Kat returns to clinic today for follow-up of her left shoulder  She reports symptoms are more or less unchanged  Pain is rated 8 out of 10  It is deep in nature  Difficulty with turning the steering wheel, lifting heavy

## 2025-04-14 RX ORDER — MELOXICAM 15 MG/1
15 TABLET ORAL DAILY PRN
Qty: 30 TABLET | Refills: 0 | OUTPATIENT
Start: 2025-04-14

## 2025-08-14 ENCOUNTER — HOSPITAL ENCOUNTER (EMERGENCY)
Age: 45
Discharge: HOME | End: 2025-08-14
Attending: EMERGENCY MEDICINE
Payer: COMMERCIAL

## 2025-08-14 VITALS
BODY MASS INDEX: 29.83 KG/M2 | HEART RATE: 114 BPM | OXYGEN SATURATION: 97 % | DIASTOLIC BLOOD PRESSURE: 99 MMHG | RESPIRATION RATE: 18 BRPM | TEMPERATURE: 98.1 F | WEIGHT: 207.89 LBS | SYSTOLIC BLOOD PRESSURE: 144 MMHG

## 2025-08-14 DIAGNOSIS — L03.116 CELLULITIS OF LEFT LOWER EXTREMITY: Primary | ICD-10-CM

## 2025-08-14 PROCEDURE — 6370000000 HC RX 637 (ALT 250 FOR IP): Performed by: EMERGENCY MEDICINE

## 2025-08-14 PROCEDURE — 99283 EMERGENCY DEPT VISIT LOW MDM: CPT

## 2025-08-14 RX ORDER — IBUPROFEN 600 MG/1
600 TABLET, FILM COATED ORAL 4 TIMES DAILY PRN
Qty: 40 TABLET | Refills: 0 | Status: SHIPPED | OUTPATIENT
Start: 2025-08-14

## 2025-08-14 RX ORDER — IBUPROFEN 600 MG/1
600 TABLET, FILM COATED ORAL
Status: COMPLETED | OUTPATIENT
Start: 2025-08-14 | End: 2025-08-14

## 2025-08-14 RX ORDER — ACETAMINOPHEN 500 MG
1000 TABLET ORAL
Status: COMPLETED | OUTPATIENT
Start: 2025-08-14 | End: 2025-08-14

## 2025-08-14 RX ORDER — SULFAMETHOXAZOLE AND TRIMETHOPRIM 800; 160 MG/1; MG/1
1 TABLET ORAL ONCE
Status: COMPLETED | OUTPATIENT
Start: 2025-08-14 | End: 2025-08-14

## 2025-08-14 RX ORDER — SULFAMETHOXAZOLE AND TRIMETHOPRIM 800; 160 MG/1; MG/1
1 TABLET ORAL 2 TIMES DAILY
Qty: 20 TABLET | Refills: 0 | Status: SHIPPED | OUTPATIENT
Start: 2025-08-14 | End: 2025-08-24

## 2025-08-14 RX ADMIN — ACETAMINOPHEN 1000 MG: 500 TABLET ORAL at 03:20

## 2025-08-14 RX ADMIN — IBUPROFEN 600 MG: 600 TABLET ORAL at 03:20

## 2025-08-14 RX ADMIN — SULFAMETHOXAZOLE AND TRIMETHOPRIM 1 TABLET: 800; 160 TABLET ORAL at 03:20

## 2025-08-14 ASSESSMENT — PAIN DESCRIPTION - PAIN TYPE: TYPE: ACUTE PAIN

## 2025-08-14 ASSESSMENT — PAIN DESCRIPTION - LOCATION: LOCATION: KNEE

## 2025-08-14 ASSESSMENT — PAIN - FUNCTIONAL ASSESSMENT
PAIN_FUNCTIONAL_ASSESSMENT: ACTIVITIES ARE NOT PREVENTED
PAIN_FUNCTIONAL_ASSESSMENT: 0-10

## 2025-08-14 ASSESSMENT — PAIN DESCRIPTION - ONSET: ONSET: ON-GOING

## 2025-08-14 ASSESSMENT — PAIN DESCRIPTION - DESCRIPTORS: DESCRIPTORS: DISCOMFORT;SORE

## 2025-08-14 ASSESSMENT — PAIN DESCRIPTION - ORIENTATION: ORIENTATION: LEFT

## 2025-08-14 ASSESSMENT — PAIN DESCRIPTION - FREQUENCY: FREQUENCY: INTERMITTENT

## 2025-08-14 ASSESSMENT — PAIN SCALES - GENERAL: PAINLEVEL_OUTOF10: 7
